# Patient Record
Sex: MALE | Race: OTHER | HISPANIC OR LATINO | URBAN - METROPOLITAN AREA
[De-identification: names, ages, dates, MRNs, and addresses within clinical notes are randomized per-mention and may not be internally consistent; named-entity substitution may affect disease eponyms.]

---

## 2019-12-05 ENCOUNTER — INPATIENT (INPATIENT)
Facility: HOSPITAL | Age: 24
LOS: 2 days | Discharge: HOME | DRG: 563 | End: 2019-12-08
Attending: STUDENT IN AN ORGANIZED HEALTH CARE EDUCATION/TRAINING PROGRAM | Admitting: HOSPITALIST
Payer: COMMERCIAL

## 2019-12-05 VITALS
HEART RATE: 93 BPM | DIASTOLIC BLOOD PRESSURE: 83 MMHG | RESPIRATION RATE: 16 BRPM | HEIGHT: 71 IN | OXYGEN SATURATION: 99 % | WEIGHT: 203.05 LBS | SYSTOLIC BLOOD PRESSURE: 124 MMHG | TEMPERATURE: 98 F

## 2019-12-05 LAB
BASOPHILS # BLD AUTO: 0.05 K/UL — SIGNIFICANT CHANGE UP (ref 0–0.2)
BASOPHILS NFR BLD AUTO: 0.6 % — SIGNIFICANT CHANGE UP (ref 0–2)
EOSINOPHIL # BLD AUTO: 0.12 K/UL — SIGNIFICANT CHANGE UP (ref 0–0.5)
EOSINOPHIL NFR BLD AUTO: 1.3 % — SIGNIFICANT CHANGE UP (ref 0–6)
HCT VFR BLD CALC: 48.1 % — SIGNIFICANT CHANGE UP (ref 39–50)
HGB BLD-MCNC: 16.9 G/DL — SIGNIFICANT CHANGE UP (ref 13–17)
IMM GRANULOCYTES NFR BLD AUTO: 1.3 % — SIGNIFICANT CHANGE UP (ref 0–1.5)
LYMPHOCYTES # BLD AUTO: 1.51 K/UL — SIGNIFICANT CHANGE UP (ref 1–3.3)
LYMPHOCYTES # BLD AUTO: 16.8 % — SIGNIFICANT CHANGE UP (ref 13–44)
MCHC RBC-ENTMCNC: 28.5 PG — SIGNIFICANT CHANGE UP (ref 27–34)
MCHC RBC-ENTMCNC: 35.1 GM/DL — SIGNIFICANT CHANGE UP (ref 32–36)
MCV RBC AUTO: 81 FL — SIGNIFICANT CHANGE UP (ref 80–100)
MONOCYTES # BLD AUTO: 0.5 K/UL — SIGNIFICANT CHANGE UP (ref 0–0.9)
MONOCYTES NFR BLD AUTO: 5.6 % — SIGNIFICANT CHANGE UP (ref 2–14)
NEUTROPHILS # BLD AUTO: 6.67 K/UL — SIGNIFICANT CHANGE UP (ref 1.8–7.4)
NEUTROPHILS NFR BLD AUTO: 74.4 % — SIGNIFICANT CHANGE UP (ref 43–77)
NRBC # BLD: 0 /100 WBCS — SIGNIFICANT CHANGE UP (ref 0–0)
PLATELET # BLD AUTO: 330 K/UL — SIGNIFICANT CHANGE UP (ref 150–400)
RBC # BLD: 5.94 M/UL — HIGH (ref 4.2–5.8)
RBC # FLD: 12.2 % — SIGNIFICANT CHANGE UP (ref 10.3–14.5)
WBC # BLD: 8.97 K/UL — SIGNIFICANT CHANGE UP (ref 3.8–10.5)
WBC # FLD AUTO: 8.97 K/UL — SIGNIFICANT CHANGE UP (ref 3.8–10.5)

## 2019-12-05 PROCEDURE — 73130 X-RAY EXAM OF HAND: CPT | Mod: 26,LT

## 2019-12-05 PROCEDURE — 99284 EMERGENCY DEPT VISIT MOD MDM: CPT

## 2019-12-05 RX ORDER — ACETAMINOPHEN 500 MG
975 TABLET ORAL ONCE
Refills: 0 | Status: COMPLETED | OUTPATIENT
Start: 2019-12-05 | End: 2019-12-05

## 2019-12-05 RX ORDER — IBUPROFEN 200 MG
600 TABLET ORAL ONCE
Refills: 0 | Status: COMPLETED | OUTPATIENT
Start: 2019-12-05 | End: 2019-12-05

## 2019-12-05 RX ADMIN — Medication 600 MILLIGRAM(S): at 22:52

## 2019-12-05 RX ADMIN — Medication 975 MILLIGRAM(S): at 22:52

## 2019-12-05 NOTE — ED ADULT NURSE NOTE - OBJECTIVE STATEMENT
23M with PMH/PSH including DM on metformin and insulin, ADHD on Adderall,  presenting to the ED with L index finger swelling and pain.  Pt reports slamming finger on fridge door this AM, noted swelling around 5 pm today. On exam pt Aox3 breathing even unlabored spontaneously NAD, left index finger swollen, purple color noted to finger, cap refill <2 seconds, +ROM in finger, no loss in sensation.

## 2019-12-05 NOTE — ED PROVIDER NOTE - PHYSICAL EXAMINATION
CONSTITUTIONAL: Patient is awake, alert and oriented x 3. Patient is well appearing and in no acute distress.  HEAD: NCAT,   NECK: supple, FROM   LUNGS: CTA B/L,  HEART: RRR.+S1S2 no murmurs,   ABDOMEN: Soft nd/nt+bs no rebound or guarding.   EXTREMITY: no edema or calf tenderness b/l, FROM upper and lower ext b/l. Left hand with diffuse swelling over dorsum with fusiform swelling and erythema distal to MCPJ. There is a scabbed abrasion just proximal to PIP. No ttp along flexor or extensor sheaths with ability to flex and extend at MCPJ, PIP and DIP. There is increased swelling and fluctuance w/ overlying ttp adjacent to nail bed on ulnar side   NEURO: No focal deficits CONSTITUTIONAL: Patient is awake, alert and oriented x 3. Patient is well appearing and in no acute distress.  HEAD: NCAT,   NECK: supple, FROM   LUNGS: CTA B/L,  HEART: RRR.+S1S2 no murmurs,   ABDOMEN: Soft nd/nt+bs no rebound or guarding.   : (chaperone Arata): uncircumcised, erythema and fissures to foreskin w/ ttp foreskin. retractable without lesions or discharge    EXTREMITY: no edema or calf tenderness b/l, FROM upper and lower ext b/l. Left hand with diffuse swelling over dorsum with fusiform swelling and erythema distal to MCPJ. There is a scabbed abrasion just proximal to PIP. No ttp along flexor or extensor sheaths with ability to flex and extend at MCPJ, PIP and DIP. There is increased swelling and fluctuance w/ overlying ttp adjacent to nail bed on ulnar side   NEURO: No focal deficits

## 2019-12-05 NOTE — ED PROVIDER NOTE - ATTENDING CONTRIBUTION TO CARE
Attending MD Mann:   I personally have seen and examined this patient.  Physician assistant note reviewed and agree on plan of care and except where noted.  See below for details.     Seen in FT2L    23M with PMH/PSH including DM on metformin and insulin, ADHD on Adderall, Amoxicillin allergy (diffuse rash, sore throat) presents to the ED with L index finger swelling and pain.  Reports slammed finger with fridge door [employer] at around 6am.  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  Reports noted swelling at around 5pm.  Reports had noted mild pain before noting the swelling.  Reports took Advil for pain at around 10am after slamming finger in fridge.  Reports is L hand dominant.  Reports tetanus UTD.  Denies fevers, chills.  Denies spontaneous drainage from finger.  Reports has a cut at the dorsal aspect of the L index finger just proximal to the PIP from a few days ago.      TO BE COMPLETED no paresthesia/no swelling/fingers/toes warm to touch/no cyanosis of extremity/capillary refill time < 2 seconds Attending MD Mann:   I personally have seen and examined this patient.  Physician assistant note reviewed and agree on plan of care and except where noted.  See below for details.     Seen in FT2L    23M with PMH/PSH including DM on metformin and insulin, ADHD on Adderall, Amoxicillin allergy (diffuse rash, sore throat) presents to the ED with L index finger swelling and pain.  Reports slammed finger with fridge door [employer] at around 6am.  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  Reports noted swelling at around 5pm.  Reports had noted mild pain before noting the swelling.  Reports took Advil for pain at around 10am after slamming finger in fridge.  Reports is L hand dominant.  Reports tetanus UTD.  Denies fevers, chills.  Denies spontaneous drainage from finger.  Reports has a cut at the dorsal aspect of the L index finger just proximal to the PIP from a few days ago.  ON exam, NAD, head NCAT, PERRL, FROM at neck, no tenderness to midline palpation, no stepoffs along length of spine, lungs CTAB with good inspiratory effort, +S1S2, no m/r/g, abdomen soft with +BS, NT, ND, no CVAT, moving all extremities with 5/5 strength bilateral upper and lower extremities, L index finger with fusiform swelling greatest at distal tip, +erythema with fluctuance especially at the ulnar aspect, ROM of finger limited secondary to pain, cap refill <2s, sensory grossly intact; A/P: 23M with L index pain and swelling, Ddx includes paronychia, ?now felon vs early flexor tenosynovitis, will obtain labs, hand consult (ortho), IV abx, cx site once I&D'ed, discussed with ortho, no abx at this time, requested finger not be I&D prior to their consult, will await    ADDENDUM: When told he would have to stay for IV abx, reports has uro appointment tomorrow, when asked why patient reported has had balanitis, reports initial medication oral/cream, SARITA Soria chaperoned, no testicular tenderness, areas of fissures on foreskin, uncircumcised

## 2019-12-05 NOTE — ED PROVIDER NOTE - OBJECTIVE STATEMENT
23 year old male with pmhx DM-1 presents to ED c/o right second finger 23 year old male with pmhx DM-1 presents to ED c/o right second finger swelling and pain today. Patient works 2 jobs and today worked a shift at [employer] where he believes he banged his left second finger. Noticed pain throughout his shift and took advil around 10am. Patient was at [employer] this evening and states he banged finger again while moving paper and noticed redness and swelling to his finger which has worsened since. Reports abrasion to same finger sustained a few days ago. Left hand dominate. Denies fevers, chills, numbness, tingling, weakness

## 2019-12-05 NOTE — ED ADULT NURSE NOTE - NSIMPLEMENTINTERV_GEN_ALL_ED
Implemented All Universal Safety Interventions:  [place] to call system. Call bell, personal items and telephone within reach. Instruct patient to call for assistance. Room bathroom lighting operational. Non-slip footwear when patient is off stretcher. Physically safe environment: no spills, clutter or unnecessary equipment. Stretcher in lowest position, wheels locked, appropriate side rails in place.
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
## 2019-12-06 ENCOUNTER — TRANSCRIPTION ENCOUNTER (OUTPATIENT)
Age: 24
End: 2019-12-06

## 2019-12-06 DIAGNOSIS — L08.9 LOCAL INFECTION OF THE SKIN AND SUBCUTANEOUS TISSUE, UNSPECIFIED: ICD-10-CM

## 2019-12-06 DIAGNOSIS — E11.65 TYPE 2 DIABETES MELLITUS WITH HYPERGLYCEMIA: ICD-10-CM

## 2019-12-06 DIAGNOSIS — I10 ESSENTIAL (PRIMARY) HYPERTENSION: ICD-10-CM

## 2019-12-06 DIAGNOSIS — F90.9 ATTENTION-DEFICIT HYPERACTIVITY DISORDER, UNSPECIFIED TYPE: ICD-10-CM

## 2019-12-06 DIAGNOSIS — Z29.9 ENCOUNTER FOR PROPHYLACTIC MEASURES, UNSPECIFIED: ICD-10-CM

## 2019-12-06 DIAGNOSIS — M65.9 SYNOVITIS AND TENOSYNOVITIS, UNSPECIFIED: ICD-10-CM

## 2019-12-06 DIAGNOSIS — E10.65 TYPE 1 DIABETES MELLITUS WITH HYPERGLYCEMIA: ICD-10-CM

## 2019-12-06 DIAGNOSIS — E78.5 HYPERLIPIDEMIA, UNSPECIFIED: ICD-10-CM

## 2019-12-06 DIAGNOSIS — Z02.9 ENCOUNTER FOR ADMINISTRATIVE EXAMINATIONS, UNSPECIFIED: ICD-10-CM

## 2019-12-06 LAB
ALBUMIN SERPL ELPH-MCNC: 3.8 G/DL — SIGNIFICANT CHANGE UP (ref 3.3–5)
ALBUMIN SERPL ELPH-MCNC: 4.8 G/DL — SIGNIFICANT CHANGE UP (ref 3.3–5)
ALP SERPL-CCNC: 125 U/L — HIGH (ref 40–120)
ALP SERPL-CCNC: 230 U/L — HIGH (ref 40–120)
ALT FLD-CCNC: 21 U/L — SIGNIFICANT CHANGE UP (ref 10–45)
ALT FLD-CCNC: 26 U/L — SIGNIFICANT CHANGE UP (ref 10–45)
ANION GAP SERPL CALC-SCNC: 15 MMOL/L — SIGNIFICANT CHANGE UP (ref 5–17)
ANION GAP SERPL CALC-SCNC: 16 MMOL/L — SIGNIFICANT CHANGE UP (ref 5–17)
APTT BLD: 30.2 SEC — SIGNIFICANT CHANGE UP (ref 27.5–36.3)
AST SERPL-CCNC: 15 U/L — SIGNIFICANT CHANGE UP (ref 10–40)
AST SERPL-CCNC: 22 U/L — SIGNIFICANT CHANGE UP (ref 10–40)
BASE EXCESS BLDV CALC-SCNC: 2 MMOL/L — SIGNIFICANT CHANGE UP (ref -2–2)
BILIRUB SERPL-MCNC: 0.6 MG/DL — SIGNIFICANT CHANGE UP (ref 0.2–1.2)
BILIRUB SERPL-MCNC: 0.6 MG/DL — SIGNIFICANT CHANGE UP (ref 0.2–1.2)
BLD GP AB SCN SERPL QL: NEGATIVE — SIGNIFICANT CHANGE UP
BUN SERPL-MCNC: 10 MG/DL — SIGNIFICANT CHANGE UP (ref 7–23)
BUN SERPL-MCNC: 12 MG/DL — SIGNIFICANT CHANGE UP (ref 7–23)
CA-I SERPL-SCNC: 1.13 MMOL/L — SIGNIFICANT CHANGE UP (ref 1.12–1.3)
CALCIUM SERPL-MCNC: 10 MG/DL — SIGNIFICANT CHANGE UP (ref 8.4–10.5)
CALCIUM SERPL-MCNC: 9.2 MG/DL — SIGNIFICANT CHANGE UP (ref 8.4–10.5)
CHLORIDE BLDV-SCNC: 98 MMOL/L — SIGNIFICANT CHANGE UP (ref 96–108)
CHLORIDE SERPL-SCNC: 87 MMOL/L — LOW (ref 96–108)
CHLORIDE SERPL-SCNC: 98 MMOL/L — SIGNIFICANT CHANGE UP (ref 96–108)
CO2 BLDV-SCNC: 28 MMOL/L — SIGNIFICANT CHANGE UP (ref 22–30)
CO2 SERPL-SCNC: 23 MMOL/L — SIGNIFICANT CHANGE UP (ref 22–31)
CO2 SERPL-SCNC: 26 MMOL/L — SIGNIFICANT CHANGE UP (ref 22–31)
CREAT SERPL-MCNC: 0.51 MG/DL — SIGNIFICANT CHANGE UP (ref 0.5–1.3)
CREAT SERPL-MCNC: 0.52 MG/DL — SIGNIFICANT CHANGE UP (ref 0.5–1.3)
GAS PNL BLDV: 127 MMOL/L — LOW (ref 135–145)
GAS PNL BLDV: SIGNIFICANT CHANGE UP
GAS PNL BLDV: SIGNIFICANT CHANGE UP
GLUCOSE BLDC GLUCOMTR-MCNC: 196 MG/DL — HIGH (ref 70–99)
GLUCOSE BLDC GLUCOMTR-MCNC: 215 MG/DL — HIGH (ref 70–99)
GLUCOSE BLDC GLUCOMTR-MCNC: 262 MG/DL — HIGH (ref 70–99)
GLUCOSE BLDC GLUCOMTR-MCNC: 262 MG/DL — HIGH (ref 70–99)
GLUCOSE BLDC GLUCOMTR-MCNC: 278 MG/DL — HIGH (ref 70–99)
GLUCOSE BLDC GLUCOMTR-MCNC: 354 MG/DL — HIGH (ref 70–99)
GLUCOSE BLDV-MCNC: 445 MG/DL — HIGH (ref 70–99)
GLUCOSE SERPL-MCNC: 298 MG/DL — HIGH (ref 70–99)
GLUCOSE SERPL-MCNC: 476 MG/DL — CRITICAL HIGH (ref 70–99)
HBA1C BLD-MCNC: >15.5 % — HIGH (ref 4–5.6)
HCO3 BLDV-SCNC: 27 MMOL/L — SIGNIFICANT CHANGE UP (ref 21–29)
HCT VFR BLDA CALC: 53 % — HIGH (ref 39–50)
HGB BLD CALC-MCNC: 17.4 G/DL — HIGH (ref 13–17)
HOROWITZ INDEX BLDV+IHG-RTO: SIGNIFICANT CHANGE UP
INR BLD: 0.94 RATIO — SIGNIFICANT CHANGE UP (ref 0.88–1.16)
LACTATE BLDV-MCNC: 1.6 MMOL/L — SIGNIFICANT CHANGE UP (ref 0.7–2)
PCO2 BLDV: 44 MMHG — SIGNIFICANT CHANGE UP (ref 35–50)
PH BLDV: 7.4 — SIGNIFICANT CHANGE UP (ref 7.35–7.45)
PO2 BLDV: 36 MMHG — SIGNIFICANT CHANGE UP (ref 25–45)
POTASSIUM BLDV-SCNC: 3.4 MMOL/L — LOW (ref 3.5–5.3)
POTASSIUM SERPL-MCNC: 3.2 MMOL/L — LOW (ref 3.5–5.3)
POTASSIUM SERPL-MCNC: 4 MMOL/L — SIGNIFICANT CHANGE UP (ref 3.5–5.3)
POTASSIUM SERPL-SCNC: 3.2 MMOL/L — LOW (ref 3.5–5.3)
POTASSIUM SERPL-SCNC: 4 MMOL/L — SIGNIFICANT CHANGE UP (ref 3.5–5.3)
PROT SERPL-MCNC: 6.8 G/DL — SIGNIFICANT CHANGE UP (ref 6–8.3)
PROT SERPL-MCNC: 7.9 G/DL — SIGNIFICANT CHANGE UP (ref 6–8.3)
PROTHROM AB SERPL-ACNC: 10.7 SEC — SIGNIFICANT CHANGE UP (ref 10–12.9)
RH IG SCN BLD-IMP: POSITIVE — SIGNIFICANT CHANGE UP
SAO2 % BLDV: 66 % — LOW (ref 67–88)
SODIUM SERPL-SCNC: 129 MMOL/L — LOW (ref 135–145)
SODIUM SERPL-SCNC: 136 MMOL/L — SIGNIFICANT CHANGE UP (ref 135–145)

## 2019-12-06 PROCEDURE — 12345: CPT | Mod: NC,GC

## 2019-12-06 PROCEDURE — 99223 1ST HOSP IP/OBS HIGH 75: CPT | Mod: GC

## 2019-12-06 PROCEDURE — 99255 IP/OBS CONSLTJ NEW/EST HI 80: CPT

## 2019-12-06 PROCEDURE — 99221 1ST HOSP IP/OBS SF/LOW 40: CPT

## 2019-12-06 RX ORDER — INSULIN GLARGINE 100 [IU]/ML
13 INJECTION, SOLUTION SUBCUTANEOUS EVERY MORNING
Refills: 0 | Status: DISCONTINUED | OUTPATIENT
Start: 2019-12-06 | End: 2019-12-06

## 2019-12-06 RX ORDER — INSULIN LISPRO 100/ML
6 VIAL (ML) SUBCUTANEOUS ONCE
Refills: 0 | Status: COMPLETED | OUTPATIENT
Start: 2019-12-06 | End: 2019-12-06

## 2019-12-06 RX ORDER — INSULIN LISPRO 100/ML
VIAL (ML) SUBCUTANEOUS
Refills: 0 | Status: DISCONTINUED | OUTPATIENT
Start: 2019-12-06 | End: 2019-12-08

## 2019-12-06 RX ORDER — INSULIN GLARGINE 100 [IU]/ML
20 INJECTION, SOLUTION SUBCUTANEOUS AT BEDTIME
Refills: 0 | Status: DISCONTINUED | OUTPATIENT
Start: 2019-12-06 | End: 2019-12-06

## 2019-12-06 RX ORDER — POVIDONE-IODINE 5 %
1 AEROSOL (ML) TOPICAL
Refills: 0 | Status: DISCONTINUED | OUTPATIENT
Start: 2019-12-06 | End: 2019-12-08

## 2019-12-06 RX ORDER — INSULIN LISPRO 100/ML
VIAL (ML) SUBCUTANEOUS EVERY 6 HOURS
Refills: 0 | Status: DISCONTINUED | OUTPATIENT
Start: 2019-12-06 | End: 2019-12-06

## 2019-12-06 RX ORDER — INFLUENZA VIRUS VACCINE 15; 15; 15; 15 UG/.5ML; UG/.5ML; UG/.5ML; UG/.5ML
0.5 SUSPENSION INTRAMUSCULAR ONCE
Refills: 0 | Status: DISCONTINUED | OUTPATIENT
Start: 2019-12-06 | End: 2019-12-08

## 2019-12-06 RX ORDER — CEFEPIME 1 G/1
INJECTION, POWDER, FOR SOLUTION INTRAMUSCULAR; INTRAVENOUS
Refills: 0 | Status: DISCONTINUED | OUTPATIENT
Start: 2019-12-06 | End: 2019-12-08

## 2019-12-06 RX ORDER — SODIUM CHLORIDE 9 MG/ML
1000 INJECTION INTRAMUSCULAR; INTRAVENOUS; SUBCUTANEOUS ONCE
Refills: 0 | Status: COMPLETED | OUTPATIENT
Start: 2019-12-06 | End: 2019-12-06

## 2019-12-06 RX ORDER — DEXTROSE 50 % IN WATER 50 %
15 SYRINGE (ML) INTRAVENOUS ONCE
Refills: 0 | Status: DISCONTINUED | OUTPATIENT
Start: 2019-12-06 | End: 2019-12-08

## 2019-12-06 RX ORDER — POTASSIUM CHLORIDE 20 MEQ
40 PACKET (EA) ORAL EVERY 4 HOURS
Refills: 0 | Status: COMPLETED | OUTPATIENT
Start: 2019-12-06 | End: 2019-12-06

## 2019-12-06 RX ORDER — SODIUM CHLORIDE 9 MG/ML
1000 INJECTION, SOLUTION INTRAVENOUS
Refills: 0 | Status: DISCONTINUED | OUTPATIENT
Start: 2019-12-06 | End: 2019-12-08

## 2019-12-06 RX ORDER — ENOXAPARIN SODIUM 100 MG/ML
40 INJECTION SUBCUTANEOUS EVERY 24 HOURS
Refills: 0 | Status: DISCONTINUED | OUTPATIENT
Start: 2019-12-06 | End: 2019-12-07

## 2019-12-06 RX ORDER — INSULIN LISPRO 100/ML
5 VIAL (ML) SUBCUTANEOUS
Refills: 0 | Status: DISCONTINUED | OUTPATIENT
Start: 2019-12-06 | End: 2019-12-06

## 2019-12-06 RX ORDER — INSULIN GLARGINE 100 [IU]/ML
18 INJECTION, SOLUTION SUBCUTANEOUS EVERY MORNING
Refills: 0 | Status: DISCONTINUED | OUTPATIENT
Start: 2019-12-07 | End: 2019-12-08

## 2019-12-06 RX ORDER — INSULIN LISPRO 100/ML
VIAL (ML) SUBCUTANEOUS AT BEDTIME
Refills: 0 | Status: DISCONTINUED | OUTPATIENT
Start: 2019-12-06 | End: 2019-12-08

## 2019-12-06 RX ORDER — CEFEPIME 1 G/1
2000 INJECTION, POWDER, FOR SOLUTION INTRAMUSCULAR; INTRAVENOUS EVERY 12 HOURS
Refills: 0 | Status: DISCONTINUED | OUTPATIENT
Start: 2019-12-06 | End: 2019-12-08

## 2019-12-06 RX ORDER — DEXTROSE 50 % IN WATER 50 %
25 SYRINGE (ML) INTRAVENOUS ONCE
Refills: 0 | Status: DISCONTINUED | OUTPATIENT
Start: 2019-12-06 | End: 2019-12-08

## 2019-12-06 RX ORDER — GLUCAGON INJECTION, SOLUTION 0.5 MG/.1ML
1 INJECTION, SOLUTION SUBCUTANEOUS ONCE
Refills: 0 | Status: DISCONTINUED | OUTPATIENT
Start: 2019-12-06 | End: 2019-12-08

## 2019-12-06 RX ORDER — VANCOMYCIN HCL 1 G
1000 VIAL (EA) INTRAVENOUS ONCE
Refills: 0 | Status: COMPLETED | OUTPATIENT
Start: 2019-12-06 | End: 2019-12-06

## 2019-12-06 RX ORDER — VANCOMYCIN HCL 1 G
1250 VIAL (EA) INTRAVENOUS EVERY 12 HOURS
Refills: 0 | Status: DISCONTINUED | OUTPATIENT
Start: 2019-12-06 | End: 2019-12-08

## 2019-12-06 RX ORDER — INSULIN LISPRO 100/ML
6 VIAL (ML) SUBCUTANEOUS
Refills: 0 | Status: DISCONTINUED | OUTPATIENT
Start: 2019-12-06 | End: 2019-12-08

## 2019-12-06 RX ORDER — CEFEPIME 1 G/1
2000 INJECTION, POWDER, FOR SOLUTION INTRAMUSCULAR; INTRAVENOUS ONCE
Refills: 0 | Status: COMPLETED | OUTPATIENT
Start: 2019-12-06 | End: 2019-12-06

## 2019-12-06 RX ORDER — DEXTROSE 50 % IN WATER 50 %
12.5 SYRINGE (ML) INTRAVENOUS ONCE
Refills: 0 | Status: DISCONTINUED | OUTPATIENT
Start: 2019-12-06 | End: 2019-12-08

## 2019-12-06 RX ADMIN — Medication 1: at 18:27

## 2019-12-06 RX ADMIN — SODIUM CHLORIDE 125 MILLILITER(S): 9 INJECTION, SOLUTION INTRAVENOUS at 07:00

## 2019-12-06 RX ADMIN — Medication 1: at 22:58

## 2019-12-06 RX ADMIN — CEFEPIME 100 MILLIGRAM(S): 1 INJECTION, POWDER, FOR SOLUTION INTRAMUSCULAR; INTRAVENOUS at 07:01

## 2019-12-06 RX ADMIN — CEFEPIME 100 MILLIGRAM(S): 1 INJECTION, POWDER, FOR SOLUTION INTRAMUSCULAR; INTRAVENOUS at 18:51

## 2019-12-06 RX ADMIN — Medication 6 UNIT(S): at 00:52

## 2019-12-06 RX ADMIN — Medication 40 MILLIEQUIVALENT(S): at 18:30

## 2019-12-06 RX ADMIN — Medication 1 APPLICATION(S): at 18:51

## 2019-12-06 RX ADMIN — Medication 2: at 11:55

## 2019-12-06 RX ADMIN — Medication 1 APPLICATION(S): at 18:52

## 2019-12-06 RX ADMIN — Medication 40 MILLIEQUIVALENT(S): at 12:09

## 2019-12-06 RX ADMIN — Medication 166.67 MILLIGRAM(S): at 23:27

## 2019-12-06 RX ADMIN — Medication 166.67 MILLIGRAM(S): at 12:08

## 2019-12-06 RX ADMIN — Medication 250 MILLIGRAM(S): at 03:25

## 2019-12-06 RX ADMIN — SODIUM CHLORIDE 1000 MILLILITER(S): 9 INJECTION INTRAMUSCULAR; INTRAVENOUS; SUBCUTANEOUS at 00:51

## 2019-12-06 RX ADMIN — Medication 5 UNIT(S): at 11:56

## 2019-12-06 RX ADMIN — Medication 3: at 08:25

## 2019-12-06 RX ADMIN — Medication 1 APPLICATION(S): at 11:56

## 2019-12-06 RX ADMIN — Medication 6 UNIT(S): at 18:27

## 2019-12-06 RX ADMIN — INSULIN GLARGINE 13 UNIT(S): 100 INJECTION, SOLUTION SUBCUTANEOUS at 05:09

## 2019-12-06 NOTE — DISCHARGE NOTE PROVIDER - HOSPITAL COURSE
22 y/o M PMHx T1DM (on insulin and metformin), ADHD (on Adderall), presented to ED with L index finger swelling and pain. Patient reports he slammed his finger against a refrigerator door at work yesterday morning. Later on in the day he banged the same finger again, then noticed swelling and redness afterwards. He also notes he had a cut to the same finger a few days earlier. Denies any fever or chills. He is left hand dominant. Denies any drainage or pus. No trauma to any other fingers. No recent hospitalizations, no recent travel. Works 2 jobs, in [employer] and [employer].         In the ED:    Afebrile, normotensive, and satting well on RA. Labs significant for . Xray hand showed circumferential soft tissue swelling of the left second digit. Given vancomycin, 1L NS, and Humalog 6u. Ortho performed decompression and cx sent.            HOSPITAL COURSE: 22 y/o M PMHx T2DM (on insulin and metformin), ADHD (on Adderall), presented to ED with L index finger swelling and pain admitted for early infectious flexor tenosynovitis s/p decompression by ortho. Wound cultures grew _________. He was empirically treated with vancomycin and cefepime (MRSA and pseudomonas). Endocrinology was consulted for Atc > 15.5 and sugars 457 at admission. Diabetes education. 22 y/o M PMHx T1DM (on insulin and metformin), ADHD (on Adderall), presented to ED with L index finger swelling and pain. Patient reports he slammed his finger against a refrigerator door at work yesterday morning. Later on in the day he banged the same finger again, then noticed swelling and redness afterwards. He also notes he had a cut to the same finger a few days earlier. Denies any fever or chills. He is left hand dominant. Denies any drainage or pus. No trauma to any other fingers. No recent hospitalizations, no recent travel. Works 2 jobs, in [employer] and [employer].     In the ED:    Afebrile, normotensive, and satting well on RA. Labs significant for . Xray hand showed circumferential soft tissue swelling of the left second digit. Given vancomycin, 1L NS, and Humalog 6u. Ortho performed decompression and cx sent.        HOSPITAL COURSE: 22 y/o M PMHx T2DM (on insulin and metformin), ADHD (on Adderall), presented to ED with L index finger swelling and pain admitted for early infectious flexor tenosynovitis s/p decompression by ortho. Wound cultures grew Group B strep, sensitive to penicillin, ampicillin and Ancef. He was empirically treated with vancomycin and cefepime (MRSA and pseudomonas), pt will transition to Keflex 500mg QID before discharge, course to be completed 12/17. Endocrinology was consulted for Atc > 15.5 and sugars 457 at admission. Diabetes education noted 24 y/o M PMHx T1DM (on insulin and metformin), ADHD (on Adderall), presented to ED with L index finger swelling and pain. Patient reports he slammed his finger against a refrigerator door at work yesterday morning. Later on in the day he banged the same finger again, then noticed swelling and redness afterwards. He also notes he had a cut to the same finger a few days earlier. Denies any fever or chills. He is left hand dominant. Denies any drainage or pus. No trauma to any other fingers. No recent hospitalizations, no recent travel. Works 2 jobs, in [employer] and [employer].     In the ED:    Afebrile, normotensive, and satting well on RA. Labs significant for . Xray hand showed circumferential soft tissue swelling of the left second digit. Given vancomycin, 1L NS, and Humalog 6u. Ortho performed decompression and cx sent.        HOSPITAL COURSE: 24 y/o M PMHx T2DM (on insulin and metformin), ADHD (on Adderall), presented to ED with L index finger swelling and pain admitted for early infectious flexor tenosynovitis s/p decompression by ortho. Wound cultures grew Group B strep, sensitive to penicillin, ampicillin and Ancef. He was empirically treated with vancomycin and cefepime (MRSA and pseudomonas), pt will transition to Keflex 500mg QID before discharge, course to be completed 12/17. Endocrinology was consulted for Atc > 15.5 and sugars 457 at admission. Diabetes education noted. pt will be discharged home on Basaglar 20u qhs, pre-meal insulin 6 units TID. Pt will need follow up with Dr. Bernice Muñoz, Endocrinology

## 2019-12-06 NOTE — PROGRESS NOTE ADULT - PROBLEM SELECTOR PLAN 1
Concern for early L index finger infectious flexor tenosynovitis s/p decompression with purulent drainage.   Vancomycin and cefepime to cover MRSA  and pseudomonas (poor DM1 control)  - c/w Vanc/Cefepime  - f/u wound culture  - Ortho recs  - Noted to have isolated Alk phos elevation, possibly from bone origin. If remains persistently elevated and not clinically improving, consider MRI to r/o osteomyelitis. Concern for early L index finger infectious flexor tenosynovitis s/p decompression with purulent drainage.   Vancomycin and cefepime to cover MRSA  and pseudomonas (poor DM1 control)  - c/w Vanc/Cefepime  - f/u wound culture  - Ortho recs  - Alk phos elevation, possibly from bone. If remains persistently elevated and not clinically improving, consider MRI to r/o osteomyelitis.

## 2019-12-06 NOTE — CHART NOTE - NSCHARTNOTEFT_GEN_A_CORE
Patient discussed with Dr. Vazquez  Increase betadine soaks to 4 times per day  Continue current technique  Thanks    TOMMY Squires MD

## 2019-12-06 NOTE — CONSULT NOTE ADULT - PROBLEM SELECTOR RECOMMENDATION 3
- BP goal less than 140/90, BP slightly above goal currently  - would continue to monitor and start antihypertensives as needed

## 2019-12-06 NOTE — CONSULT NOTE ADULT - SUBJECTIVE AND OBJECTIVE BOX
22 yo male presents to [facility] ED c/o Left index finger swelling and pain for one day duration.  Patient states yesterday he closed his finger in the door at work, since then it became swollen, red and TTP.  Pain worsened and the patient presented to ER for care.  Patient denies any fvers or chills, denies night sweats, states pain is mainly at tip of finger, able to bend and extend.  Has hx of DM1, not taking insulin at this time, Blood sugar > 400 in ER.    PAST MEDICAL & SURGICAL HISTORY:  ADHD  Type 1 diabetes      Vital Signs Last 24 Hrs  T(C): 36.8 (05 Dec 2019 22:26), Max: 36.8 (05 Dec 2019 22:26)  T(F): 98.2 (05 Dec 2019 22:26), Max: 98.2 (05 Dec 2019 22:26)  HR: 93 (05 Dec 2019 22:26) (93 - 93)  BP: 124/83 (05 Dec 2019 22:26) (124/83 - 124/83)  BP(mean): --  RR: 16 (05 Dec 2019 22:26) (16 - 16)  SpO2: 99% (05 Dec 2019 22:26) (99% - 99%)    PE:   Gen: NAD, AAOx3    LUE:   Fusiform swelling of left index finger, mainly along distal tip, no swelling or erythema in other digits or palm  Erythema to distal ulnar aspect of index finger, palpable fluctuance, TTP mainly dorsally,   No TTP along Volar surface of pulp or flexor sheath, no pain with digit flex/ext  No collar button abscess appreciated, no TTP or swelling along other digits, AIN/PIN/Ulnar/Radial/Median nerve intact, silt globally, compartments soft.       Procedure: After verbal consent, 8cc of 1% lidocaine was injected into 2nd digit for local anesthetic,  The finger was prepped and draped in sterile fashion, cleaned with alcohol 11 blade was used to incise paronychia gross pus cultured from wound, irrigated thoroughly with sterile saline, covered with xeroform, 4x4 and gerard. Pt tolerated procedure well.

## 2019-12-06 NOTE — CONSULT NOTE ADULT - SUBJECTIVE AND OBJECTIVE BOX
HPI:  Patient is a 23 year old man PMH uncontrolled DM2, HTN, HLD, ADHD (on Adderall), presented to ED with L index finger swelling and pain, now admitted for management of cellulitis of the finger. Consult called for management of uncontrolled DM2.    Patient was diagnosed with DM2 at age 21. States he follows with endocrinologist Dr. Clementine Muñoz. Reports his last HbA1c was ~14% in March 2019. Has not seen endocrine since then. Currently on Metformin 2 grams daily, but misses half the doses. Also takes Basaglar, but takes 5 units before breakfast and lunch and 10 units before dinner. He did not know how Basaglar works. Does not check BG. He does not have symptoms of neuropathy. Saw optho within the last year, does not have retinopathy. No history of nephropathy. Diet wise, he is trying to cut the soda, does not drink juice. He admits to eating a lot of carbs/fast food. He has been exercising more and has intentionally lost about 15 lbs. No blurry vision, polyuria, polydipsia.     PAST MEDICAL & SURGICAL HISTORY:  Type 2 diabetes mellitus  ADHD  No significant past surgical history    FAMILY HISTORY:  DM2 in father    Social History:  No cigarette use  no alcohol use  works in [employer]    Outpatient Medications:  · 	Basaglar KwikPen 100 units/mL subcutaneous solution: Last Dose Taken:  , 20 unit(s) subcutaneous once a day (at bedtime)  · 	metFORMIN 1000 mg oral tablet: Last Dose Taken:  , 1000 milligram(s) orally once a day    MEDICATIONS  (STANDING):  cefepime   IVPB      cefepime   IVPB 2000 milliGRAM(s) IV Intermittent every 12 hours  clotrimazole 1% Cream 1 Application(s) Topical two times a day  dextrose 5%. 1000 milliLiter(s) (50 mL/Hr) IV Continuous <Continuous>  dextrose 50% Injectable 12.5 Gram(s) IV Push once  dextrose 50% Injectable 25 Gram(s) IV Push once  dextrose 50% Injectable 25 Gram(s) IV Push once  enoxaparin Injectable 40 milliGRAM(s) SubCutaneous every 24 hours  influenza   Vaccine 0.5 milliLiter(s) IntraMuscular once  insulin glargine Injectable (LANTUS) 20 Unit(s) SubCutaneous at bedtime  insulin lispro (HumaLOG) corrective regimen sliding scale   SubCutaneous three times a day before meals  insulin lispro (HumaLOG) corrective regimen sliding scale   SubCutaneous at bedtime  insulin lispro (HumaLOG) corrective regimen sliding scale   SubCutaneous every 6 hours  insulin lispro Injectable (HumaLOG) 5 Unit(s) SubCutaneous three times a day before meals  lactated ringers. 1000 milliLiter(s) (125 mL/Hr) IV Continuous <Continuous>  potassium chloride    Tablet ER 40 milliEquivalent(s) Oral every 4 hours  povidone iodine 10% Solution 1 Application(s) Topical two times a day  vancomycin  IVPB 1250 milliGRAM(s) IV Intermittent every 12 hours    MEDICATIONS  (PRN):  dextrose 40% Gel 15 Gram(s) Oral once PRN Blood Glucose LESS THAN 70 milliGRAM(s)/deciliter  glucagon  Injectable 1 milliGRAM(s) IntraMuscular once PRN Glucose LESS THAN 70 milligrams/deciliter      Allergies  amoxicillin (Anaphylaxis; Rash)    Review of Systems:  Constitutional: No fever  Eyes: No blurry vision  Neuro: No tremors  HEENT: No pain  Cardiovascular: No chest pain, palpitations  Respiratory: No SOB, no cough  GI: No nausea, vomiting, abdominal pain  : No dysuria  Skin: + wound on finger  Endocrine: no polyuria, polydipsia    ALL OTHER SYSTEMS REVIEWED AND NEGATIVE    PHYSICAL EXAM:  VITALS: T(C): 37.2 (12-06-19 @ 12:07)  T(F): 99 (12-06-19 @ 12:07), Max: 99 (12-06-19 @ 12:07)  HR: 85 (12-06-19 @ 12:07) (80 - 93)  BP: 143/93 (12-06-19 @ 12:07) (124/83 - 143/93)  RR:  (16 - 16)  SpO2:  (98% - 99%)  Wt(kg): --  GENERAL: NAD, well-developed  EYES: No proptosis, anicteric  HEENT:  Atraumatic, Normocephalic  THYROID: Normal size, no palpable nodules  RESPIRATORY: Clear to auscultation bilaterally; No rales, rhonchi, wheezing  CARDIOVASCULAR: Regular rate and rhythm; No murmurs; no peripheral edema  GI: Soft, nontender, non distended, normal bowel sounds  SKIN: Dry, intact, No ulcers on feet; left index finger bandaged   PSYCH: Alert and oriented x 3, reactive affect      POCT Blood Glucose.: 215 mg/dL (12-06-19 @ 11:40) H 5, H 2  POCT Blood Glucose.: 262 mg/dL (12-06-19 @ 07:29) L 13  POCT Blood Glucose.: 262 mg/dL (12-06-19 @ 04:41)  POCT Blood Glucose.: 354 mg/dL (12-06-19 @ 01:50)  POCT Blood Glucose.: 387 mg/dL (12-06-19 @ 00:38)                          16.9   8.97  )-----------( 330      ( 05 Dec 2019 23:25 )             48.1       12-06    136  |  98  |  10  ----------------------------<  298<H>  3.2<L>   |  23  |  0.51    EGFR if : 178  EGFR if non : 154    Ca    9.2      12-06    TPro  6.8  /  Alb  3.8  /  TBili  0.6  /  DBili  x   /  AST  15  /  ALT  21  /  AlkPhos  125<H>  12-06

## 2019-12-06 NOTE — PROGRESS NOTE ADULT - PROBLEM SELECTOR PLAN 2
on admission, s/p Humalog 6u and 13U lantus. Patient reports he is on Basaglar 20u at home, but splits it up into 3 doses.  - NPO in case of OR in AM  - will give Lantus 13u now  - diabetes education  - social work consult for affording insulin   - ISS q6h, finger sticks  on admission, s/p Humalog 6u and 13U lantus. Patient reports he is on Basaglar 20u at home, but splits it up into 3 doses.  -Lantus 20U  - diabetes education  - social work consult for affording insulin   - ISS q6h, finger sticks  on admission, s/p Humalog 6u and 13U lantus. Patient reports he is on Basaglar 20u at home, but splits it up into 3 doses.  -Lantus 18U with Humalog 6 TID pre meals  - diabetes education  - social work consult for affording insulin   - ISS q6h, finger sticks

## 2019-12-06 NOTE — H&P ADULT - PROBLEM SELECTOR PLAN 1
L index finger purulent cellulitis s/p I&D. Will cover for MRSA with Vancomycin  - c/w Vanc 1g q12h  - f/u wound culture Concern for early L index finger flexor tenosynovitis, now s/p decompression with purulent drainage. Will cover for MRSA given purulent cellulitis  - c/w Vanc 1g q12h  - f/u wound culture Concern for early L index finger flexor tenosynovitis, now s/p decompression with purulent drainage. Will cover for MRSA given purulent cellulitis and for pseudomonas given poorly controlled DM  - c/w Vanc/Cefepime  - f/u wound culture  - Noted to have isolated Alk phos elevation, possibly from bone origin. If remains persistently elevated and not clinically improving then consider MRI to r/o osteomyelitis. Concern for early L index finger infectious flexor tenosynovitis, now s/p decompression with purulent drainage. Will cover for MRSA given purulent cellulitis and for pseudomonas given poorly controlled DM  - c/w Vanc/Cefepime  - f/u wound culture  - f/u Ortho recs in AM, may take to OR if clinically does not improve  - Noted to have isolated Alk phos elevation, possibly from bone origin. If remains persistently elevated and not clinically improving then consider MRI to r/o osteomyelitis.

## 2019-12-06 NOTE — DISCHARGE NOTE PROVIDER - CARE PROVIDER_API CALL
Linda Vazquez)  Orthopedics  [address] [place] [address], [address]  [place] [address]  Phone: [phone]  Fax: [phone]  Follow Up Time: Linda Vazquez)  Orthopedics  [address] [place] [address], [address]  [place] [address]  Phone: [phone]  Fax: [phone]  Follow Up Time:

## 2019-12-06 NOTE — CONSULT NOTE ADULT - ASSESSMENT
23 year old man PMH uncontrolled DM2, HTN, HLD, ADHD (on Adderall), here with cellulitis of the finger, also with hyperglycemia in setting of uncontrolled DM2. BG goal 100-180 mg/dL.

## 2019-12-06 NOTE — DISCHARGE NOTE PROVIDER - NSDCMRMEDTOKEN_GEN_ALL_CORE_FT
Melissaaglbrie Foster 100 units/mL subcutaneous solution: 20 unit(s) subcutaneous once a day (at bedtime)  metFORMIN 1000 mg oral tablet: 1000 milligram(s) orally once a day Melissaaglar KwikPen 100 units/mL subcutaneous solution: 20 unit(s) subcutaneous once a day (at bedtime)  cephalexin 500 mg oral capsule: 1 cap(s) orally 4 times a day  metFORMIN 1000 mg oral tablet: 1000 milligram(s) orally once a day  povidone iodine 10% topical solution: Apply topically to affected area 4 times a day alcohol swabs : Apply topically to affected area 4 times a day   Basaglar KwikPen 100 units/mL subcutaneous solution: 20 unit(s) subcutaneous once a day (at bedtime)  cephalexin 500 mg oral capsule: 1 cap(s) orally 4 times a day  glucometer (per patient&#x27;s insurance): 1 application subcutaneous 4 times a day   HumaLOG KwikPen 100 units/mL injectable solution: 6 unit(s) injectable 3 times a day (before meals)   metFORMIN 1000 mg oral tablet: 1000 milligram(s) orally once a day  povidone iodine 10% topical solution: Apply topically to affected area 4 times a day

## 2019-12-06 NOTE — H&P ADULT - PROBLEM SELECTOR PLAN 2
on admission, s/p Humalog 6u in ED. Patient reports he is on Basaglar 20u at home, but splits it up into 3 doses.   - c/w Basaglar 20u qhs  - diabetes education  on admission, s/p Humalog 6u in ED. Patient reports he is on Basaglar 20u at home, but splits it up into 3 doses.   - Pt NPO in case of OR in AM  - will give dose of NPH to bridge until nighttime Lantus  - diabetes education  on admission, s/p Humalog 6u in ED. Patient reports he is on Basaglar 20u at home, but splits it up into 3 doses.   - NPO in case of OR in AM  - will give Lantus 13u now  - diabetes education  - ISS q6h while NPO  - Endocrine consult in AM  on admission, s/p Humalog 6u in ED. Patient reports he is on Basaglar 20u at home, but splits it up into 3 doses.  - NPO in case of OR in AM  - will give Lantus 13u now  - diabetes education  - social work consult as patient may be rationing insulin   - ISS q6h while NPO  - Endocrine consult in AM
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
## 2019-12-06 NOTE — PROGRESS NOTE ADULT - ASSESSMENT
22 y/o M PMHx T1DM (on insulin and metformin), ADHD (on Adderall), presented to ED with L index finger swelling and pain, concern for early infectious flexor tenosynovitis s/p decompression on vancomycin and cefepime

## 2019-12-06 NOTE — CONSULT NOTE ADULT - ATTENDING COMMENTS
Colt Marie MD   Pager # [phone]  On evenings and weekends, please call the office at [phone] or page endocrine fellow on call. Please note that this patient may be followed by different provider tomorrow. If no answer, contact the office.

## 2019-12-06 NOTE — H&P ADULT - NSHPREVIEWOFSYSTEMS_GEN_ALL_CORE
CONSTITUTIONAL: No fever, no chills, no weight loss  EYES: No eye pain, no visual disturbance  ENT: No sore throat, no ear pain  RESPIRATORY: No cough, No SOB  CARDIOVASCULAR: No CP, no palpitations  GASTROINTESTINAL: no abdominal pain, no n/v/d  GENITOURINARY: No dysuria, no hematuria  HEME: No easy bruising, no bleeding gums  NEURO: No headaches, no weakness  SKIN: No itching, no rashes  MSK: +L index finger pain, + swelling

## 2019-12-06 NOTE — H&P ADULT - NSHPPHYSICALEXAM_GEN_ALL_CORE
Vital Signs Last 24 Hrs  T(C): 36.8 (05 Dec 2019 22:26), Max: 36.8 (05 Dec 2019 22:26)  T(F): 98.2 (05 Dec 2019 22:26), Max: 98.2 (05 Dec 2019 22:26)  HR: 93 (05 Dec 2019 22:26) (93 - 93)  BP: 124/83 (05 Dec 2019 22:26) (124/83 - 124/83)  BP(mean): --  RR: 16 (05 Dec 2019 22:26) (16 - 16)  SpO2: 99% (05 Dec 2019 22:26) (99% - 99%)    GENERAL: NAD, well-developed  HEAD:  Atraumatic, normocephalic  EYES: EOMI, conjunctiva and sclera clear  MOUTH: MMM, no lesions  NECK: Supple, no appreciable masses  LUNG: Clear to auscultation bilaterally, no increased work of breathing  CHEST: S1/S2+, no S3/S4, RRR, no murmurs appreciated  ABDOMEN: Soft, nontender, nondistended, +BS in all 4 quadrants  : No CVA tenderness, no suprapubic tenderness, no lopez catheter  EXTREMITIES:  L index finger swelling s/p I&D  NEURO: AAOx3, no focal deficits, strength grossly intact in all extremities  SKIN: warm and dry, no visible rashes

## 2019-12-06 NOTE — PROGRESS NOTE ADULT - SUBJECTIVE AND OBJECTIVE BOX
Shiloh Anand MD  PGY-1  Pager [phone]  Pager LID [phone]      Patient is a 23y old  Male who presents with a chief complaint of L index finger pain (06 Dec 2019 04:00)        SUBJECTIVE / OVERNIGHT EVENTS: Patient had no acute events overnight. Patient seen and examined at bedside this morning. His pain has improved from 8/10 to 3/10 in Left index finger. His endocrinologist is Dr. Bernice Muñoz and reports his last A1c 14 in May.     ROS: [ - ] Fever [ - ] Chills [ - ] Nausea/Vomiting [ - ] Chest Pain [ - ] Shortness of breath     MEDICATIONS  (STANDING):  cefepime   IVPB      cefepime   IVPB 2000 milliGRAM(s) IV Intermittent every 12 hours  clotrimazole 1% Cream 1 Application(s) Topical two times a day  dextrose 5%. 1000 milliLiter(s) (50 mL/Hr) IV Continuous <Continuous>  dextrose 50% Injectable 12.5 Gram(s) IV Push once  dextrose 50% Injectable 25 Gram(s) IV Push once  dextrose 50% Injectable 25 Gram(s) IV Push once  enoxaparin Injectable 40 milliGRAM(s) SubCutaneous every 24 hours  influenza   Vaccine 0.5 milliLiter(s) IntraMuscular once  insulin glargine Injectable (LANTUS) 20 Unit(s) SubCutaneous at bedtime  insulin lispro (HumaLOG) corrective regimen sliding scale   SubCutaneous three times a day before meals  insulin lispro (HumaLOG) corrective regimen sliding scale   SubCutaneous at bedtime  insulin lispro (HumaLOG) corrective regimen sliding scale   SubCutaneous every 6 hours  insulin lispro Injectable (HumaLOG) 5 Unit(s) SubCutaneous three times a day before meals  lactated ringers. 1000 milliLiter(s) (125 mL/Hr) IV Continuous <Continuous>  potassium chloride    Tablet ER 40 milliEquivalent(s) Oral every 4 hours  vancomycin  IVPB 1250 milliGRAM(s) IV Intermittent every 12 hours    MEDICATIONS  (PRN):  dextrose 40% Gel 15 Gram(s) Oral once PRN Blood Glucose LESS THAN 70 milliGRAM(s)/deciliter  glucagon  Injectable 1 milliGRAM(s) IntraMuscular once PRN Glucose LESS THAN 70 milligrams/deciliter      Vital Signs Last 24 Hrs  T(C): 36.7 (06 Dec 2019 04:12), Max: 36.8 (05 Dec 2019 22:26)  T(F): 98.1 (06 Dec 2019 04:12), Max: 98.2 (05 Dec 2019 22:26)  HR: 80 (06 Dec 2019 04:12) (80 - 93)  BP: 139/87 (06 Dec 2019 04:12) (124/83 - 139/87)  BP(mean): --  RR: 16 (06 Dec 2019 04:12) (16 - 16)  SpO2: 98% (06 Dec 2019 04:12) (98% - 99%)  CAPILLARY BLOOD GLUCOSE      POCT Blood Glucose.: 262 mg/dL (06 Dec 2019 07:29)  POCT Blood Glucose.: 262 mg/dL (06 Dec 2019 04:41)  POCT Blood Glucose.: 354 mg/dL (06 Dec 2019 01:50)  POCT Blood Glucose.: 387 mg/dL (06 Dec 2019 00:38)    I&O's Summary      PHYSICAL EXAM  GENERAL: NAD, lying comfortably in bed   HEENT:  Atraumatic, Normocephalic, EOMI, conjunctiva and sclera clear, no LAD  CHEST/LUNG: Clear to auscultation bilaterally; No wheeze  HEART: RRR, S1 and S2 No murmurs, rubs, or gallops  ABDOMEN: Soft, Nontender, Nondistended; Bowel sounds present  EXTREMITIES:  2+ Peripheral Pulses, No clubbing, cyanosis, or edema. Left index finger limited flexion and extension due to dressing. Dressing CDI. No erythema, fluctuance, or warmth  NEURO: AAOx3, non-focal  SKIN: No rashes or lesions    LABS:                        16.9   8.97  )-----------( 330      ( 05 Dec 2019 23:25 )             48.1     12-06    136  |  98  |  10  ----------------------------<  298<H>  3.2<L>   |  23  |  0.51    Ca    9.2      06 Dec 2019 06:54    TPro  6.8  /  Alb  3.8  /  TBili  0.6  /  DBili  x   /  AST  15  /  ALT  21  /  AlkPhos  125<H>  12-06    PT/INR - ( 06 Dec 2019 06:54 )   PT: 10.7 sec;   INR: 0.94 ratio         PTT - ( 06 Dec 2019 06:54 )  PTT:30.2 sec            RADIOLOGY & ADDITIONAL TESTS:  < from: Xray Hand 3 Views, Left (12.05.19 @ 22:56) >    ******PRELIMINARY REPORT******    ******PRELIMINARY REPORT******          EXAM:  HAND LEFT (MINIMUM 3 VIEWS)                            PROCEDURE DATE:  12/05/2019      ******PRELIMINARY REPORT******    ******PRELIMINARY REPORT******              INTERPRETATION:  no acute fractures or dislocations.  circumferential soft tissue swelling of the left second digit.              ******PRELIMINARY REPORT******    ******PRELIMINARY REPORT******          ANNELISE RODRIGUEZ M.D., RADIOLOGY RESIDENT    < end of copied text >    Imaging Personally Reviewed:  Consultant(s) Notes Reviewed:

## 2019-12-06 NOTE — H&P ADULT - HISTORY OF PRESENT ILLNESS
22 y/o M PMHx T1DM (on insulin and metformin), ADHD (on Adderall), presented to ED with L index finger swelling and pain. Patient reports he slammed his finger against a refrigerator door at work yesterday morning. Later on in the day he banged the same finger again, then noticed swelling and redness afterwards. He also notes he had a cut to the same finger a few days earlier. Denies any fever or chills. He is left hand dominant. Denies any drainage or pus. No trauma to any other fingers. No recent hospitalizations, no recent travel. Works 2 jobs, in [employer] and [employer].     In the ED:  Afebrile, normotensive, and satting well on RA. Labs significant for . Xray hand showed circumferential soft tissue swelling of the left second digit. Given vancomycin, 1L NS, and Humalog 6u. Ortho performed I&D and cx sent. 24 y/o M PMHx T1DM (on insulin and metformin), ADHD (on Adderall), presented to ED with L index finger swelling and pain. Patient reports he slammed his finger against a refrigerator door at work yesterday morning. Later on in the day he banged the same finger again, then noticed swelling and redness afterwards. He also notes he had a cut to the same finger a few days earlier. Denies any fever or chills. He is left hand dominant. Denies any drainage or pus. No trauma to any other fingers. No recent hospitalizations, no recent travel. Works 2 jobs, in [employer] and [employer].     In the ED:  Afebrile, normotensive, and satting well on RA. Labs significant for . Xray hand showed circumferential soft tissue swelling of the left second digit. Given vancomycin, 1L NS, and Humalog 6u. Ortho performed decompression and cx sent.

## 2019-12-06 NOTE — CONSULT NOTE ADULT - PROBLEM SELECTOR RECOMMENDATION 9
- last HbA1c ~14% per patient, will follow up HbA1c  - received Lantus 13 units this AM  - recommend increase Lantus to 18 units qAM starting tomorrow and adjust Humalog to 6 units before meals  - c/w low correction scale qac and qhs  - consistent carb diet  - check FS qac and qhs  - recommend RD consult   - will follow  - for discharge: final recommendations to be determined by HbA1c and insulin requirements while inpatient, may need basal bolus on discharge. To follow up with Dr. Muñoz as outpatient. Discussed with patient importance of improved glycemic control to reduce risk of infection, amputation, retinopathy, ESRD, CVA, MI etc

## 2019-12-06 NOTE — DISCHARGE NOTE PROVIDER - NSDCCPCAREPLAN_GEN_ALL_CORE_FT
PRINCIPAL DISCHARGE DIAGNOSIS  Diagnosis: Flexor tenosynovitis of finger  Assessment and Plan of Treatment: You came to the hospital for a swollen left index finger after injuring it. Our orthopedic doctor decompressed your finger and we collected cultures on it. Your wound culture grew _____. We gave you antibiotics and your hand swelling improved.      SECONDARY DISCHARGE DIAGNOSES  Diagnosis: Uncontrolled diabetes mellitus  Assessment and Plan of Treatment: You came to the hospital and were found to have high blood sugars >400. Your A1c was >15.5 and our endocrinologist came to see you. It is important to check your sugars and to use your insulin properly in order to have your wounds heal. Please follow up with your endocrinologist within 2 weeks. PRINCIPAL DISCHARGE DIAGNOSIS  Diagnosis: Flexor tenosynovitis of finger  Assessment and Plan of Treatment: You came to the hospital for a swollen left index finger after injuring it. Our orthopedics surgeon decompressed your finger and we collected wound culture. We gave you antibiotics and your hand swelling improved. Please continue to take Keflex, 1 tablet 4x per day for 10 days. Your course will finish on 12/17/19. Please follow up with Dr. Vazquez (Orthopedics surgery) in clinic on 12/11,  call office tomorrow to confirm appointment      SECONDARY DISCHARGE DIAGNOSES  Diagnosis: Uncontrolled diabetes mellitus  Assessment and Plan of Treatment: You came to the hospital and were found to have high blood sugars >400. Your A1c was >15.5 and our endocrinologist came to see you. It is important to check your sugars and to use your insulin properly in order to have your wounds heal. Please follow up with your endocrinologist  Dr. Muñoz within 1- 2 weeks. PRINCIPAL DISCHARGE DIAGNOSIS  Diagnosis: Flexor tenosynovitis of finger  Assessment and Plan of Treatment: You came to the hospital for a swollen left index finger after injuring it. Our orthopedics surgeon decompressed your finger and we collected wound culture. We gave you antibiotics and your hand swelling improved. Please continue to take Keflex, 1 tablet 4x per day for 10 days. Your course will finish on 12/17/19. Please follow up with Dr. Vazquez (Orthopedics surgery) in clinic on 12/11,  call office tomorrow to confirm appointment      SECONDARY DISCHARGE DIAGNOSES  Diagnosis: Uncontrolled diabetes mellitus  Assessment and Plan of Treatment: You came to the hospital and were found to have high blood sugars >400. Your A1c was >15.5 and our endocrinologist came to see you. It is important to check your sugars and to use your insulin properly in order to have your wounds heal. You will continue to take 20 units of Basaglar at bedtime.  You will start to take pre-meal insulin 6 units, 3 times  a day. We also prescribed a glucometer for you so you could monitor your blood sugar. Please follow up with your endocrinologist  Dr. Muñoz within 1- 2 weeks.

## 2019-12-06 NOTE — H&P ADULT - NSHPLABSRESULTS_GEN_ALL_CORE
12-05    129<L>  |  87<L>  |  12  ----------------------------<  476<HH>  4.0   |  26  |  0.52    Ca    10.0      05 Dec 2019 23:25    TPro  7.9  /  Alb  4.8  /  TBili  0.6  /  DBili  x   /  AST  22  /  ALT  26  /  AlkPhos  230<H>  12-05                            16.9   8.97  )-----------( 330      ( 05 Dec 2019 23:25 )             48.1     CAPILLARY BLOOD GLUCOSE      POCT Blood Glucose.: 354 mg/dL (06 Dec 2019 01:50)  POCT Blood Glucose.: 387 mg/dL (06 Dec 2019 00:38)    Blood Gas Source Venous: Venous (12-06 @ 00:43)      RADIOLOGY:  < from: Xray Hand 3 Views, Left (12.05.19 @ 22:56) >  EXAM:  HAND LEFT (MINIMUM 3 VIEWS)                        PROCEDURE DATE:  12/05/2019      ******PRELIMINARY REPORT******    ******PRELIMINARY REPORT******          INTERPRETATION:  no acute fractures or dislocations.  circumferential soft tissue swelling of the left second digit.  < end of copied text >

## 2019-12-06 NOTE — PROGRESS NOTE ADULT - ATTENDING COMMENTS
Pt seen and examined. 23M with poorly controlled type I DM, pw L index finger swelling and pain, concern for early infectious flexor tenosynovitis. Currently on vanco/cefepime. Care discussed with Dr. Squires (ortho) - no need for urgent OR at this time. Recommend BID betadine soaks and dry dressings. Appreciate endo recs on insulin regimen. Extensive education provided on importance of glycemic control especially in setting of infection for healing. Pt verbalized understanding    Manjula Brooks MD  Division of Hospital Medicine  Cell: [phone]  Pager: [phone]  Office: [phone] Pt seen and examined. 23M with poorly controlled type II DM, pw L index finger swelling and pain, concern for early infectious flexor tenosynovitis. Currently on vanco/cefepime. Care discussed with Dr. Squires (ortho) - no need for urgent OR at this time. Recommend BID betadine soaks and dry dressings. Appreciate endo recs on insulin regimen. Extensive education provided on importance of glycemic control especially in setting of infection for healing. Pt verbalized understanding    Manjula Brooks MD  Division of Hospital Medicine  Cell: [phone]  Pager: [phone]  Office: [phone]

## 2019-12-06 NOTE — H&P ADULT - PROBLEM SELECTOR PLAN 4
DVT Proph: IMPROVE score 0  Diet: CC  Dispo: d/c tomorrow likely DVT Proph: IMPROVE score 0  Diet: CC  Dispo: pending w/u

## 2019-12-06 NOTE — CONSULT NOTE ADULT - ASSESSMENT
22 yo male with Left Index Finger Paronychia, possible early Flexor Tenosynovitis    - Admit to medical team for monitoring/blood glucose control   - Pt tolerated decompression well, FU abscess cultures, Abx per primary team until cultures return  - Pt glucose>400 on admission, high risk for worsening of symptoms given uncontrolled diabetes, will monitor for worsening or developing flexor tenosynovitis  - NPO until eval today, IV fluids while NPO  - FInger sticks  - Possible OR today if symptoms worsen  - Will D/w Dr Vazquez and advise if plan changes

## 2019-12-06 NOTE — CHART NOTE - NSCHARTNOTEFT_GEN_A_CORE
Patient seen and examined  Per patient, appears to be improving  Afebrile    Circumferential swelling present  site of paronychia drainage with no new purulence  Patient not holding finger flexed, no pain with passive extension  No TTP over flexor tendon sheath  SILT throughout  WWP distally, good cap refill    A/P: s/p drainage of paronychia with cellulitis of finger    continue abx  BID betadine soaks  dry dressings  will continue to follow    Patient discussed with Dr. Vazquez who agrees with plan    TOMMY Squires MD

## 2019-12-07 LAB
ALBUMIN SERPL ELPH-MCNC: 3.8 G/DL — SIGNIFICANT CHANGE UP (ref 3.3–5)
ALP SERPL-CCNC: 105 U/L — SIGNIFICANT CHANGE UP (ref 40–120)
ALT FLD-CCNC: 21 U/L — SIGNIFICANT CHANGE UP (ref 10–45)
ANION GAP SERPL CALC-SCNC: 15 MMOL/L — SIGNIFICANT CHANGE UP (ref 5–17)
AST SERPL-CCNC: 14 U/L — SIGNIFICANT CHANGE UP (ref 10–40)
BILIRUB SERPL-MCNC: 0.6 MG/DL — SIGNIFICANT CHANGE UP (ref 0.2–1.2)
BUN SERPL-MCNC: 10 MG/DL — SIGNIFICANT CHANGE UP (ref 7–23)
CALCIUM SERPL-MCNC: 9.6 MG/DL — SIGNIFICANT CHANGE UP (ref 8.4–10.5)
CHLORIDE SERPL-SCNC: 100 MMOL/L — SIGNIFICANT CHANGE UP (ref 96–108)
CO2 SERPL-SCNC: 22 MMOL/L — SIGNIFICANT CHANGE UP (ref 22–31)
CREAT SERPL-MCNC: 0.5 MG/DL — SIGNIFICANT CHANGE UP (ref 0.5–1.3)
GLUCOSE BLDC GLUCOMTR-MCNC: 228 MG/DL — HIGH (ref 70–99)
GLUCOSE BLDC GLUCOMTR-MCNC: 231 MG/DL — HIGH (ref 70–99)
GLUCOSE BLDC GLUCOMTR-MCNC: 241 MG/DL — HIGH (ref 70–99)
GLUCOSE BLDC GLUCOMTR-MCNC: 305 MG/DL — HIGH (ref 70–99)
GLUCOSE SERPL-MCNC: 304 MG/DL — HIGH (ref 70–99)
HCT VFR BLD CALC: 48.7 % — SIGNIFICANT CHANGE UP (ref 39–50)
HGB BLD-MCNC: 16.5 G/DL — SIGNIFICANT CHANGE UP (ref 13–17)
MAGNESIUM SERPL-MCNC: 2.1 MG/DL — SIGNIFICANT CHANGE UP (ref 1.6–2.6)
MCHC RBC-ENTMCNC: 28.7 PG — SIGNIFICANT CHANGE UP (ref 27–34)
MCHC RBC-ENTMCNC: 33.9 GM/DL — SIGNIFICANT CHANGE UP (ref 32–36)
MCV RBC AUTO: 84.8 FL — SIGNIFICANT CHANGE UP (ref 80–100)
NRBC # BLD: 0 /100 WBCS — SIGNIFICANT CHANGE UP (ref 0–0)
PHOSPHATE SERPL-MCNC: 3.6 MG/DL — SIGNIFICANT CHANGE UP (ref 2.5–4.5)
PLATELET # BLD AUTO: 266 K/UL — SIGNIFICANT CHANGE UP (ref 150–400)
POTASSIUM SERPL-MCNC: 3.8 MMOL/L — SIGNIFICANT CHANGE UP (ref 3.5–5.3)
POTASSIUM SERPL-SCNC: 3.8 MMOL/L — SIGNIFICANT CHANGE UP (ref 3.5–5.3)
PROT SERPL-MCNC: 7 G/DL — SIGNIFICANT CHANGE UP (ref 6–8.3)
RBC # BLD: 5.74 M/UL — SIGNIFICANT CHANGE UP (ref 4.2–5.8)
RBC # FLD: 12.5 % — SIGNIFICANT CHANGE UP (ref 10.3–14.5)
SODIUM SERPL-SCNC: 137 MMOL/L — SIGNIFICANT CHANGE UP (ref 135–145)
WBC # BLD: 6.41 K/UL — SIGNIFICANT CHANGE UP (ref 3.8–10.5)
WBC # FLD AUTO: 6.41 K/UL — SIGNIFICANT CHANGE UP (ref 3.8–10.5)

## 2019-12-07 PROCEDURE — 99233 SBSQ HOSP IP/OBS HIGH 50: CPT | Mod: GC

## 2019-12-07 RX ORDER — ACETAMINOPHEN 500 MG
650 TABLET ORAL EVERY 6 HOURS
Refills: 0 | Status: DISCONTINUED | OUTPATIENT
Start: 2019-12-07 | End: 2019-12-08

## 2019-12-07 RX ADMIN — CEFEPIME 100 MILLIGRAM(S): 1 INJECTION, POWDER, FOR SOLUTION INTRAMUSCULAR; INTRAVENOUS at 06:43

## 2019-12-07 RX ADMIN — Medication 6 UNIT(S): at 17:24

## 2019-12-07 RX ADMIN — Medication 2: at 12:39

## 2019-12-07 RX ADMIN — Medication 2: at 17:24

## 2019-12-07 RX ADMIN — Medication 6 UNIT(S): at 12:38

## 2019-12-07 RX ADMIN — INSULIN GLARGINE 18 UNIT(S): 100 INJECTION, SOLUTION SUBCUTANEOUS at 10:14

## 2019-12-07 RX ADMIN — CEFEPIME 100 MILLIGRAM(S): 1 INJECTION, POWDER, FOR SOLUTION INTRAMUSCULAR; INTRAVENOUS at 17:23

## 2019-12-07 RX ADMIN — Medication 1 APPLICATION(S): at 06:44

## 2019-12-07 RX ADMIN — Medication 1 APPLICATION(S): at 17:25

## 2019-12-07 RX ADMIN — Medication 1 APPLICATION(S): at 06:43

## 2019-12-07 RX ADMIN — Medication 1 APPLICATION(S): at 17:24

## 2019-12-07 RX ADMIN — Medication 2: at 10:15

## 2019-12-07 RX ADMIN — Medication 2: at 21:16

## 2019-12-07 RX ADMIN — Medication 6 UNIT(S): at 10:15

## 2019-12-07 RX ADMIN — ENOXAPARIN SODIUM 40 MILLIGRAM(S): 100 INJECTION SUBCUTANEOUS at 06:44

## 2019-12-07 RX ADMIN — Medication 650 MILLIGRAM(S): at 17:30

## 2019-12-07 RX ADMIN — Medication 166.67 MILLIGRAM(S): at 12:38

## 2019-12-07 NOTE — PROGRESS NOTE ADULT - ASSESSMENT
22 y/o M PMHx T1DM (on insulin and metformin), ADHD (on Adderall), presented with L index finger swelling and pain, concern for early infectious flexor tenosynovitis s/p decompression on vancomycin and cefepime

## 2019-12-07 NOTE — PROGRESS NOTE ADULT - SUBJECTIVE AND OBJECTIVE BOX
Orthopedic Surgery Progress Note  S: Pain is well controlled.  Swelling improving. Afebrile    O:  Vital Signs Last 24 Hrs  T(C): 36.9 (06 Dec 2019 21:02), Max: 37.2 (06 Dec 2019 12:07)  T(F): 98.5 (06 Dec 2019 21:02), Max: 99 (06 Dec 2019 12:07)  HR: 103 (06 Dec 2019 21:02) (80 - 103)  BP: 123/79 (06 Dec 2019 21:02) (123/79 - 143/93)  RR: 18 (06 Dec 2019 21:02) (16 - 18)  SpO2: 99% (06 Dec 2019 21:02) (98% - 99%)    Gen: NAD  LUE  Circumferential swelling present, improving  site of paronychia drainage with some expressible purulence  Patient not holding finger flexed, no pain with passive extension  No TTP over flexor tendon sheath  SILT throughout  WWP distally, good cap refill                        16.9   8.97  )-----------( 330      ( 05 Dec 2019 23:25 )             48.1     12-06    136  |  98  |  10  ----------------------------<  298<H>  3.2<L>   |  23  |  0.51    PT/INR - ( 06 Dec 2019 06:54 )   PT: 10.7 sec;   INR: 0.94 ratio       PTT - ( 06 Dec 2019 06:54 )  PTT:30.2 sec    A/P 23y year old Male s/p drainage of paronychia L index finger    continue abx  4x per day betadine soaks  dry dressings  will continue to follow    Brendan Squires MD

## 2019-12-07 NOTE — PROGRESS NOTE ADULT - PROBLEM SELECTOR PLAN 1
Concern for early L index finger infectious flexor tenosynovitis s/p decompression with purulent drainage.   Vancomycin and cefepime to cover MRSA  and pseudomonas (poor DM1 control)  - c/w Vanc/Cefepime  - f/u wound culture  - Ortho recs  - Alk phos elevation, possibly from bone. If remains persistently elevated and not clinically improving, consider MRI to r/o osteomyelitis. Concern for early L index finger infectious flexor tenosynovitis s/p decompression with purulent drainage.   Vancomycin and cefepime to cover MRSA  and pseudomonas (poor DM1 control)  - c/w Vanc/Cefepime, transition to PO Keflex  - wound culture: Group B strep prelim  - Ortho recs Concern for early L index finger infectious flexor tenosynovitis s/p decompression with purulent drainage.   Vancomycin and cefepime to cover MRSA  and pseudomonas (poor DM1 control)  - c/w Cefepime, transition to PO Keflex on discharge  - wound culture: Group B strep prelim  - Ortho recs

## 2019-12-07 NOTE — PROGRESS NOTE ADULT - ATTENDING COMMENTS
Pt seen and examined. no fever overnight, MCP joint with improved swelling, pt able to flex his finger. Appreciate ortho recs. Given noncompliance and high risk of developing complication such as septic joint, will continue IV abx inpatient until improvement. Continue tight glycemic control    Manjula Brooks MD  Division of Hospital Medicine  Cell: [phone]  Pager: [phone]  Office: [phone]

## 2019-12-07 NOTE — PROGRESS NOTE ADULT - PROBLEM SELECTOR PLAN 2
on admission, s/p Humalog 6u and 13U lantus. Patient reports he is on Basaglar 20u at home, but splits it up into 3 doses.  -Lantus 18U with Humalog 6 TID pre meals  - diabetes education  - social work consult for affording insulin   - ISS q6h, finger sticks

## 2019-12-07 NOTE — PROGRESS NOTE ADULT - SUBJECTIVE AND OBJECTIVE BOX
Shilho nAand MD  PGY-1  Pager -[phone]  Pager G [phone]      Patient is a 23y old  Male who presents with a chief complaint of L index finger pain (07 Dec 2019 03:20)        SUBJECTIVE / OVERNIGHT EVENTS: Patient had no acute events overnight. Patient seen and examined at bedside this morning. L hand swelling and left index finger pain has improved. Patient tolerated diet    ROS: [ - ] Fever [ - ] Chills [ - ] Nausea/Vomiting [ - ] Chest Pain [ - ] Shortness of breath     MEDICATIONS  (STANDING):  cefepime   IVPB      cefepime   IVPB 2000 milliGRAM(s) IV Intermittent every 12 hours  clotrimazole 1% Cream 1 Application(s) Topical two times a day  dextrose 5%. 1000 milliLiter(s) (50 mL/Hr) IV Continuous <Continuous>  dextrose 50% Injectable 12.5 Gram(s) IV Push once  dextrose 50% Injectable 25 Gram(s) IV Push once  dextrose 50% Injectable 25 Gram(s) IV Push once  enoxaparin Injectable 40 milliGRAM(s) SubCutaneous every 24 hours  influenza   Vaccine 0.5 milliLiter(s) IntraMuscular once  insulin glargine Injectable (LANTUS) 18 Unit(s) SubCutaneous every morning  insulin lispro (HumaLOG) corrective regimen sliding scale   SubCutaneous three times a day before meals  insulin lispro (HumaLOG) corrective regimen sliding scale   SubCutaneous at bedtime  insulin lispro Injectable (HumaLOG) 6 Unit(s) SubCutaneous three times a day before meals  lactated ringers. 1000 milliLiter(s) (125 mL/Hr) IV Continuous <Continuous>  povidone iodine 10% Solution 1 Application(s) Topical two times a day  vancomycin  IVPB 1250 milliGRAM(s) IV Intermittent every 12 hours    MEDICATIONS  (PRN):  dextrose 40% Gel 15 Gram(s) Oral once PRN Blood Glucose LESS THAN 70 milliGRAM(s)/deciliter  glucagon  Injectable 1 milliGRAM(s) IntraMuscular once PRN Glucose LESS THAN 70 milligrams/deciliter      Vital Signs Last 24 Hrs  T(C): 36.3 (07 Dec 2019 04:12), Max: 37.2 (06 Dec 2019 12:07)  T(F): 97.3 (07 Dec 2019 04:12), Max: 99 (06 Dec 2019 12:07)  HR: 70 (07 Dec 2019 04:12) (70 - 103)  BP: 125/79 (07 Dec 2019 04:12) (123/79 - 143/93)  BP(mean): --  RR: 18 (07 Dec 2019 04:12) (16 - 18)  SpO2: 100% (07 Dec 2019 04:12) (99% - 100%)  CAPILLARY BLOOD GLUCOSE      POCT Blood Glucose.: 278 mg/dL (06 Dec 2019 22:31)  POCT Blood Glucose.: 196 mg/dL (06 Dec 2019 18:24)  POCT Blood Glucose.: 215 mg/dL (06 Dec 2019 11:40)    I&O's Summary    06 Dec 2019 07:01  -  07 Dec 2019 07:00  --------------------------------------------------------  IN: 480 mL / OUT: 0 mL / NET: 480 mL        PHYSICAL EXAM  GENERAL: NAD, lying comfortably in bed   HEENT:  Atraumatic, Normocephalic, EOMI, conjunctiva and sclera clear, no LAD  CHEST/LUNG: Clear to auscultation bilaterally; No wheeze  HEART: RRR, S1 and S2 No murmurs, rubs, or gallops  ABDOMEN: Soft, Nontender, Nondistended; Bowel sounds present  EXTREMITIES:  2+ Peripheral Pulses, No clubbing, cyanosis, or edema. Left index finger flexion and extension at metacarpal joint. Dressing CDI. No erythema, fluctuance, or warmth. Left hand swelling decreased  NEURO: AAOx3, non-focal  SKIN: No rashes or lesions    LABS:                        16.5   6.41  )-----------( 266      ( 07 Dec 2019 06:54 )             48.7     12-07    137  |  100  |  10  ----------------------------<  304<H>  3.8   |  22  |  0.50    Ca    9.6      07 Dec 2019 06:51  Phos  3.6     12-07  Mg     2.1     12-07    TPro  7.0  /  Alb  3.8  /  TBili  0.6  /  DBili  x   /  AST  14  /  ALT  21  /  AlkPhos  105  12-07    PT/INR - ( 06 Dec 2019 06:54 )   PT: 10.7 sec;   INR: 0.94 ratio         PTT - ( 06 Dec 2019 06:54 )  PTT:30.2 sec            RADIOLOGY & ADDITIONAL TESTS:  < from: Xray Hand 3 Views, Left (12.05.19 @ 22:56) >  EXAM:  HAND LEFT (MINIMUM 3 VIEWS)                            PROCEDURE DATE:  12/05/2019            INTERPRETATION:  CLINICAL INFORMATION: Left index finger swelling and   pain.    TECHNIQUE: 3 views of the left hand.    COMPARISON: None available.    IMPRESSION:     There is no acute fracture or dislocation of the left hand.The joint   spaces are maintained. There is circumferential soft tissue swelling of   the left second digit.            < end of copied text >    Imaging Personally Reviewed:  Consultant(s) Notes Reviewed:

## 2019-12-08 ENCOUNTER — TRANSCRIPTION ENCOUNTER (OUTPATIENT)
Age: 24
End: 2019-12-08

## 2019-12-08 VITALS
TEMPERATURE: 97 F | HEART RATE: 93 BPM | OXYGEN SATURATION: 100 % | RESPIRATION RATE: 18 BRPM | DIASTOLIC BLOOD PRESSURE: 69 MMHG | SYSTOLIC BLOOD PRESSURE: 122 MMHG

## 2019-12-08 LAB
ANION GAP SERPL CALC-SCNC: 16 MMOL/L — SIGNIFICANT CHANGE UP (ref 5–17)
BUN SERPL-MCNC: 13 MG/DL — SIGNIFICANT CHANGE UP (ref 7–23)
CALCIUM SERPL-MCNC: 9.3 MG/DL — SIGNIFICANT CHANGE UP (ref 8.4–10.5)
CHLORIDE SERPL-SCNC: 97 MMOL/L — SIGNIFICANT CHANGE UP (ref 96–108)
CO2 SERPL-SCNC: 24 MMOL/L — SIGNIFICANT CHANGE UP (ref 22–31)
CREAT SERPL-MCNC: 0.66 MG/DL — SIGNIFICANT CHANGE UP (ref 0.5–1.3)
GLUCOSE BLDC GLUCOMTR-MCNC: 184 MG/DL — HIGH (ref 70–99)
GLUCOSE BLDC GLUCOMTR-MCNC: 243 MG/DL — HIGH (ref 70–99)
GLUCOSE SERPL-MCNC: 290 MG/DL — HIGH (ref 70–99)
HCT VFR BLD CALC: 45.9 % — SIGNIFICANT CHANGE UP (ref 39–50)
HGB BLD-MCNC: 15.3 G/DL — SIGNIFICANT CHANGE UP (ref 13–17)
MAGNESIUM SERPL-MCNC: 2 MG/DL — SIGNIFICANT CHANGE UP (ref 1.6–2.6)
MCHC RBC-ENTMCNC: 28.2 PG — SIGNIFICANT CHANGE UP (ref 27–34)
MCHC RBC-ENTMCNC: 33.3 GM/DL — SIGNIFICANT CHANGE UP (ref 32–36)
MCV RBC AUTO: 84.5 FL — SIGNIFICANT CHANGE UP (ref 80–100)
NRBC # BLD: 0 /100 WBCS — SIGNIFICANT CHANGE UP (ref 0–0)
PHOSPHATE SERPL-MCNC: 4.1 MG/DL — SIGNIFICANT CHANGE UP (ref 2.5–4.5)
PLATELET # BLD AUTO: 267 K/UL — SIGNIFICANT CHANGE UP (ref 150–400)
POTASSIUM SERPL-MCNC: 3.3 MMOL/L — LOW (ref 3.5–5.3)
POTASSIUM SERPL-SCNC: 3.3 MMOL/L — LOW (ref 3.5–5.3)
RBC # BLD: 5.43 M/UL — SIGNIFICANT CHANGE UP (ref 4.2–5.8)
RBC # FLD: 12.2 % — SIGNIFICANT CHANGE UP (ref 10.3–14.5)
SODIUM SERPL-SCNC: 137 MMOL/L — SIGNIFICANT CHANGE UP (ref 135–145)
VANCOMYCIN TROUGH SERPL-MCNC: <4 UG/ML — LOW (ref 10–20)
WBC # BLD: 6.78 K/UL — SIGNIFICANT CHANGE UP (ref 3.8–10.5)
WBC # FLD AUTO: 6.78 K/UL — SIGNIFICANT CHANGE UP (ref 3.8–10.5)

## 2019-12-08 PROCEDURE — 99239 HOSP IP/OBS DSCHRG MGMT >30: CPT | Mod: GC

## 2019-12-08 PROCEDURE — 86900 BLOOD TYPING SEROLOGIC ABO: CPT

## 2019-12-08 PROCEDURE — 85027 COMPLETE CBC AUTOMATED: CPT

## 2019-12-08 PROCEDURE — 80048 BASIC METABOLIC PNL TOTAL CA: CPT

## 2019-12-08 PROCEDURE — 82010 KETONE BODYS QUAN: CPT

## 2019-12-08 PROCEDURE — 84132 ASSAY OF SERUM POTASSIUM: CPT

## 2019-12-08 PROCEDURE — 85730 THROMBOPLASTIN TIME PARTIAL: CPT

## 2019-12-08 PROCEDURE — 87070 CULTURE OTHR SPECIMN AEROBIC: CPT

## 2019-12-08 PROCEDURE — 82330 ASSAY OF CALCIUM: CPT

## 2019-12-08 PROCEDURE — 84295 ASSAY OF SERUM SODIUM: CPT

## 2019-12-08 PROCEDURE — 73130 X-RAY EXAM OF HAND: CPT

## 2019-12-08 PROCEDURE — 83605 ASSAY OF LACTIC ACID: CPT

## 2019-12-08 PROCEDURE — 84100 ASSAY OF PHOSPHORUS: CPT

## 2019-12-08 PROCEDURE — 85014 HEMATOCRIT: CPT

## 2019-12-08 PROCEDURE — 86850 RBC ANTIBODY SCREEN: CPT

## 2019-12-08 PROCEDURE — 83036 HEMOGLOBIN GLYCOSYLATED A1C: CPT

## 2019-12-08 PROCEDURE — 85610 PROTHROMBIN TIME: CPT

## 2019-12-08 PROCEDURE — 87205 SMEAR GRAM STAIN: CPT

## 2019-12-08 PROCEDURE — 86901 BLOOD TYPING SEROLOGIC RH(D): CPT

## 2019-12-08 PROCEDURE — 82435 ASSAY OF BLOOD CHLORIDE: CPT

## 2019-12-08 PROCEDURE — 80202 ASSAY OF VANCOMYCIN: CPT

## 2019-12-08 PROCEDURE — 93005 ELECTROCARDIOGRAM TRACING: CPT

## 2019-12-08 PROCEDURE — 83735 ASSAY OF MAGNESIUM: CPT

## 2019-12-08 PROCEDURE — 99285 EMERGENCY DEPT VISIT HI MDM: CPT

## 2019-12-08 PROCEDURE — 82803 BLOOD GASES ANY COMBINATION: CPT

## 2019-12-08 PROCEDURE — 82947 ASSAY GLUCOSE BLOOD QUANT: CPT

## 2019-12-08 PROCEDURE — 82962 GLUCOSE BLOOD TEST: CPT

## 2019-12-08 PROCEDURE — 80053 COMPREHEN METABOLIC PANEL: CPT

## 2019-12-08 RX ORDER — ISOPROPYL ALCOHOL, BENZOCAINE .7; .06 ML/ML; ML/ML
1 SWAB TOPICAL
Qty: 100 | Refills: 1
Start: 2019-12-08 | End: 2020-01-26

## 2019-12-08 RX ORDER — POTASSIUM CHLORIDE 20 MEQ
40 PACKET (EA) ORAL ONCE
Refills: 0 | Status: COMPLETED | OUTPATIENT
Start: 2019-12-08 | End: 2019-12-08

## 2019-12-08 RX ORDER — CEPHALEXIN 500 MG
1 CAPSULE ORAL
Qty: 40 | Refills: 0
Start: 2019-12-08 | End: 2019-12-17

## 2019-12-08 RX ORDER — INSULIN LISPRO 100/ML
6 VIAL (ML) SUBCUTANEOUS
Qty: 5 | Refills: 0
Start: 2019-12-08 | End: 2020-01-06

## 2019-12-08 RX ORDER — CEPHALEXIN 500 MG
500 CAPSULE ORAL
Refills: 0 | Status: DISCONTINUED | OUTPATIENT
Start: 2019-12-08 | End: 2019-12-08

## 2019-12-08 RX ORDER — METFORMIN HYDROCHLORIDE 850 MG/1
1000 TABLET ORAL
Qty: 0 | Refills: 0 | DISCHARGE

## 2019-12-08 RX ORDER — CEPHALEXIN 500 MG
500 CAPSULE ORAL EVERY 12 HOURS
Refills: 0 | Status: DISCONTINUED | OUTPATIENT
Start: 2019-12-08 | End: 2019-12-08

## 2019-12-08 RX ORDER — POVIDONE-IODINE 5 %
1 AEROSOL (ML) TOPICAL
Refills: 0 | Status: DISCONTINUED | OUTPATIENT
Start: 2019-12-08 | End: 2019-12-08

## 2019-12-08 RX ORDER — INSULIN LISPRO 100/ML
VIAL (ML) SUBCUTANEOUS EVERY 6 HOURS
Refills: 0 | Status: DISCONTINUED | OUTPATIENT
Start: 2019-12-08 | End: 2019-12-08

## 2019-12-08 RX ORDER — INSULIN GLARGINE 100 [IU]/ML
20 INJECTION, SOLUTION SUBCUTANEOUS
Qty: 0 | Refills: 0 | DISCHARGE

## 2019-12-08 RX ORDER — POVIDONE-IODINE 5 %
1 AEROSOL (ML) TOPICAL
Qty: 0 | Refills: 0 | DISCHARGE
Start: 2019-12-08

## 2019-12-08 RX ADMIN — Medication 1 APPLICATION(S): at 06:24

## 2019-12-08 RX ADMIN — Medication 166.67 MILLIGRAM(S): at 03:47

## 2019-12-08 RX ADMIN — Medication 2: at 07:22

## 2019-12-08 RX ADMIN — Medication 40 MILLIEQUIVALENT(S): at 11:10

## 2019-12-08 RX ADMIN — INSULIN GLARGINE 18 UNIT(S): 100 INJECTION, SOLUTION SUBCUTANEOUS at 07:59

## 2019-12-08 RX ADMIN — Medication 6 UNIT(S): at 13:21

## 2019-12-08 RX ADMIN — Medication 1: at 13:20

## 2019-12-08 RX ADMIN — Medication 500 MILLIGRAM(S): at 13:43

## 2019-12-08 RX ADMIN — SODIUM CHLORIDE 125 MILLILITER(S): 9 INJECTION, SOLUTION INTRAVENOUS at 02:31

## 2019-12-08 RX ADMIN — CEFEPIME 100 MILLIGRAM(S): 1 INJECTION, POWDER, FOR SOLUTION INTRAMUSCULAR; INTRAVENOUS at 06:24

## 2019-12-08 NOTE — PROGRESS NOTE ADULT - ASSESSMENT
24 y/o M PMHx T1DM (on insulin and metformin), ADHD (on Adderall), presented with L index finger swelling and pain, concern for early infectious flexor tenosynovitis s/p decompression. Wound Cx showed GBS sensitive ampicillin penicillin and Ancef,  will transition pt to PO Abx today

## 2019-12-08 NOTE — PROGRESS NOTE ADULT - SUBJECTIVE AND OBJECTIVE BOX
Orthopedic Progress Note     S:  No acute events overnight, pain is well controlled.  Patient denies any chest pain, SOB, N/V, fevers/chills.    T(C): 36.6 (12-08-19 @ 04:49), Max: 36.6 (12-08-19 @ 04:49)  HR: 61 (12-08-19 @ 04:49) (61 - 83)  BP: 113/73 (12-08-19 @ 04:49) (113/73 - 121/69)  RR: 18 (12-08-19 @ 04:49) (18 - 18)  SpO2: 97% (12-08-19 @ 04:49) (97% - 100%)  Wt(kg): --I&O's Summary    07 Dec 2019 07:01  -  08 Dec 2019 07:00  --------------------------------------------------------  IN: 600 mL / OUT: 0 mL / NET: 600 mL        O:  PE:  Gen: NAD  LUE  Mild swelling about distal phalyx/DIP  site of paronychia drainage with minimal expressible purulence  Patient not holding finger flexed, no pain with passive extension  No TTP over flexor tendon sheath  SILT throughout  WWP distally, good cap refill      Labs:                        15.3   6.78  )-----------( 267      ( 08 Dec 2019 06:55 )             45.9    12-08    137  |  97  |  13  ----------------------------<  290<H>  3.3<L>   |  24  |  0.66    Ca    9.3      08 Dec 2019 06:55  Phos  4.1     12-08  Mg     2.0     12-08    TPro  7.0  /  Alb  3.8  /  TBili  0.6  /  DBili  x   /  AST  14  /  ALT  21  /  AlkPhos  105  12-07 Orthopedic Progress Note     S:  No acute events overnight, pain is significantly improved from yesterday.  Patient denies any chest pain, SOB, N/V, fevers/chills.    T(C): 36.6 (12-08-19 @ 04:49), Max: 36.6 (12-08-19 @ 04:49)  HR: 61 (12-08-19 @ 04:49) (61 - 83)  BP: 113/73 (12-08-19 @ 04:49) (113/73 - 121/69)  RR: 18 (12-08-19 @ 04:49) (18 - 18)  SpO2: 97% (12-08-19 @ 04:49) (97% - 100%)  Wt(kg): --I&O's Summary    07 Dec 2019 07:01  -  08 Dec 2019 07:00  --------------------------------------------------------  IN: 600 mL / OUT: 0 mL / NET: 600 mL        O:  PE:  Gen: NAD  LUE  Mild swelling about distal phalynx/DIP, improved  site of paronychia drainage with minimal expressible purulence  Patient not holding finger flexed, no pain with passive extension  No TTP over flexor tendon sheath  SILT throughout  WWP distally, good cap refill      Labs:                        15.3   6.78  )-----------( 267      ( 08 Dec 2019 06:55 )             45.9    12-08    137  |  97  |  13  ----------------------------<  290<H>  3.3<L>   |  24  |  0.66    Ca    9.3      08 Dec 2019 06:55  Phos  4.1     12-08  Mg     2.0     12-08    TPro  7.0  /  Alb  3.8  /  TBili  0.6  /  DBili  x   /  AST  14  /  ALT  21  /  AlkPhos  105  12-07

## 2019-12-08 NOTE — PROGRESS NOTE ADULT - PROBLEM SELECTOR PLAN 3
States on Adderall at home  - no prescriptions on [facility] #[id number]
States on Adderall at home  - no prescriptions on [facility] #[id number]
States on Adderall at home  - no prescriptions on [facility] #[id number]

## 2019-12-08 NOTE — PROGRESS NOTE ADULT - ASSESSMENT
A/P 23y year old Male s/p drainage of paronychia L index finger, symptoms appear to be resolving.     continue abx  4x per day betadine soaks  dry dressings  will continue to follow A/P 23y year old Male s/p drainage of paronychia L index finger, symptoms appear to be resolving.     continue abx  4x per day betadine soaks  dry dressings  will continue to follow  Pt stable for DC today, pt is to see Dr. Vazquez in the Office on 12/11, call office for appointment  no further ortho intervention   ortho stable

## 2019-12-08 NOTE — PROGRESS NOTE ADULT - SUBJECTIVE AND OBJECTIVE BOX
Russ Saldivar, PGY2  Pager: [phone] ([facility]), [phone] ([facility])   After 7: Night Float pager    Patient is a 23y old  Male who presents with a chief complaint of L index finger pain (07 Dec 2019 09:52)      SUBJECTIVE / OVERNIGHT EVENTS:    MEDICATIONS  (STANDING):  cefepime   IVPB      cefepime   IVPB 2000 milliGRAM(s) IV Intermittent every 12 hours  clotrimazole 1% Cream 1 Application(s) Topical two times a day  dextrose 5%. 1000 milliLiter(s) (50 mL/Hr) IV Continuous <Continuous>  dextrose 50% Injectable 12.5 Gram(s) IV Push once  dextrose 50% Injectable 25 Gram(s) IV Push once  dextrose 50% Injectable 25 Gram(s) IV Push once  influenza   Vaccine 0.5 milliLiter(s) IntraMuscular once  insulin glargine Injectable (LANTUS) 18 Unit(s) SubCutaneous every morning  insulin lispro (HumaLOG) corrective regimen sliding scale   SubCutaneous every 6 hours  insulin lispro Injectable (HumaLOG) 6 Unit(s) SubCutaneous three times a day before meals  lactated ringers. 1000 milliLiter(s) (125 mL/Hr) IV Continuous <Continuous>  povidone iodine 10% Solution 1 Application(s) Topical two times a day  vancomycin  IVPB 1250 milliGRAM(s) IV Intermittent every 12 hours    MEDICATIONS  (PRN):  acetaminophen   Tablet .. 650 milliGRAM(s) Oral every 6 hours PRN Mild Pain (1 - 3)  dextrose 40% Gel 15 Gram(s) Oral once PRN Blood Glucose LESS THAN 70 milliGRAM(s)/deciliter  glucagon  Injectable 1 milliGRAM(s) IntraMuscular once PRN Glucose LESS THAN 70 milligrams/deciliter      Vital Signs Last 24 Hrs  T(C): 36.6 (08 Dec 2019 04:49), Max: 36.6 (08 Dec 2019 04:49)  T(F): 97.8 (08 Dec 2019 04:49), Max: 97.8 (08 Dec 2019 04:49)  HR: 61 (08 Dec 2019 04:49) (61 - 83)  BP: 113/73 (08 Dec 2019 04:49) (113/73 - 121/69)  BP(mean): --  RR: 18 (08 Dec 2019 04:49) (18 - 18)  SpO2: 97% (08 Dec 2019 04:49) (97% - 100%)  CAPILLARY BLOOD GLUCOSE      POCT Blood Glucose.: 243 mg/dL (08 Dec 2019 06:31)  POCT Blood Glucose.: 305 mg/dL (07 Dec 2019 21:06)  POCT Blood Glucose.: 241 mg/dL (07 Dec 2019 17:03)  POCT Blood Glucose.: 231 mg/dL (07 Dec 2019 12:32)  POCT Blood Glucose.: 228 mg/dL (07 Dec 2019 10:13)    I&O's Summary    07 Dec 2019 07:01  -  08 Dec 2019 07:00  --------------------------------------------------------  IN: 600 mL / OUT: 0 mL / NET: 600 mL        PHYSICAL EXAM:  GENERAL: NAD, well-developed  EYES: EOMI, PERRLA, conjunctiva and sclera clear  NECK:  No JVD  CHEST/LUNG: CTABL ; No wheeze  HEART: RRR; No murmurs  ABDOMEN: Soft, Nontender, Nondistended; Bowel sounds present  EXTREMITIES:  2+ Peripheral Pulses, No edema  MUSCULOSKEL:   PSYCH: AAOx   NEUROLOGY: CN 2-12 grossly intact, strength, sensory,   SKIN: No rashes or lesions. No sacral ulcer    LABS:                        16.5   6.41  )-----------( 266      ( 07 Dec 2019 06:54 )             48.7     12-07    137  |  100  |  10  ----------------------------<  304<H>  3.8   |  22  |  0.50    Ca    9.6      07 Dec 2019 06:51  Phos  3.6     12-07  Mg     2.1     12-07    TPro  7.0  /  Alb  3.8  /  TBili  0.6  /  DBili  x   /  AST  14  /  ALT  21  /  AlkPhos  105  12-07      RADIOLOGY & ADDITIONAL TESTS:  < from: Xray Hand 3 Views, Left (12.05.19 @ 22:56) >  IMPRESSION:     There is no acute fracture or dislocation of the left hand.The joint   spaces are maintained. There is circumferential soft tissue swelling of   the left second digit.        < end of copied text >          Consultant(s) Notes Reviewed:  Orthopedics Russ Saldivar, PGY2  Pager: [phone] ([facility]), [phone] ([facility])   After 7: Night Float pager    Patient is a 23y old  Male who presents with a chief complaint of L index finger pain (07 Dec 2019 09:52)      SUBJECTIVE / OVERNIGHT EVENTS: no acute event overnight, pt was seen and examined at bedside in AM,  denies fever, chills, SOB, finger pain.      MEDICATIONS  (STANDING):  cefepime   IVPB      cefepime   IVPB 2000 milliGRAM(s) IV Intermittent every 12 hours  clotrimazole 1% Cream 1 Application(s) Topical two times a day  dextrose 5%. 1000 milliLiter(s) (50 mL/Hr) IV Continuous <Continuous>  dextrose 50% Injectable 12.5 Gram(s) IV Push once  dextrose 50% Injectable 25 Gram(s) IV Push once  dextrose 50% Injectable 25 Gram(s) IV Push once  influenza   Vaccine 0.5 milliLiter(s) IntraMuscular once  insulin glargine Injectable (LANTUS) 18 Unit(s) SubCutaneous every morning  insulin lispro (HumaLOG) corrective regimen sliding scale   SubCutaneous every 6 hours  insulin lispro Injectable (HumaLOG) 6 Unit(s) SubCutaneous three times a day before meals  lactated ringers. 1000 milliLiter(s) (125 mL/Hr) IV Continuous <Continuous>  povidone iodine 10% Solution 1 Application(s) Topical two times a day  vancomycin  IVPB 1250 milliGRAM(s) IV Intermittent every 12 hours    MEDICATIONS  (PRN):  acetaminophen   Tablet .. 650 milliGRAM(s) Oral every 6 hours PRN Mild Pain (1 - 3)  dextrose 40% Gel 15 Gram(s) Oral once PRN Blood Glucose LESS THAN 70 milliGRAM(s)/deciliter  glucagon  Injectable 1 milliGRAM(s) IntraMuscular once PRN Glucose LESS THAN 70 milligrams/deciliter      Vital Signs Last 24 Hrs  T(C): 36.6 (08 Dec 2019 04:49), Max: 36.6 (08 Dec 2019 04:49)  T(F): 97.8 (08 Dec 2019 04:49), Max: 97.8 (08 Dec 2019 04:49)  HR: 61 (08 Dec 2019 04:49) (61 - 83)  BP: 113/73 (08 Dec 2019 04:49) (113/73 - 121/69)  BP(mean): --  RR: 18 (08 Dec 2019 04:49) (18 - 18)  SpO2: 97% (08 Dec 2019 04:49) (97% - 100%)  CAPILLARY BLOOD GLUCOSE      POCT Blood Glucose.: 243 mg/dL (08 Dec 2019 06:31)  POCT Blood Glucose.: 305 mg/dL (07 Dec 2019 21:06)  POCT Blood Glucose.: 241 mg/dL (07 Dec 2019 17:03)  POCT Blood Glucose.: 231 mg/dL (07 Dec 2019 12:32)  POCT Blood Glucose.: 228 mg/dL (07 Dec 2019 10:13)    I&O's Summary    07 Dec 2019 07:01  -  08 Dec 2019 07:00  --------------------------------------------------------  IN: 600 mL / OUT: 0 mL / NET: 600 mL        PHYSICAL EXAM:  GENERAL: NAD, well-developed  EYES: EOMI, PERRLA, conjunctiva and sclera clear  NECK:  No JVD  CHEST/LUNG: CTABL ; No wheeze  HEART: RRR; No murmurs  ABDOMEN: Soft, Nontender, Nondistended; Bowel sounds present  EXTREMITIES:  2+ Peripheral Pulses, No edema  MUSCULOSKEL:  + dressing over L. index finger, no pain with active and passive ROM, minimal drainage     PSYCH: AAOx 3    NEUROLOGY: no focal deficit    SKIN: No rashes or lesions. No sacral ulcer    LABS:                        16.5   6.41  )-----------( 266      ( 07 Dec 2019 06:54 )             48.7     12-07    137  |  100  |  10  ----------------------------<  304<H>  3.8   |  22  |  0.50    Ca    9.6      07 Dec 2019 06:51  Phos  3.6     12-07  Mg     2.1     12-07    TPro  7.0  /  Alb  3.8  /  TBili  0.6  /  DBili  x   /  AST  14  /  ALT  21  /  AlkPhos  105  12-07      RADIOLOGY & ADDITIONAL TESTS:  < from: Xray Hand 3 Views, Left (12.05.19 @ 22:56) >  IMPRESSION:     There is no acute fracture or dislocation of the left hand.The joint   spaces are maintained. There is circumferential soft tissue swelling of   the left second digit.    < end of copied text >      Consultant(s) Notes Reviewed:  Orthopedics

## 2019-12-08 NOTE — DISCHARGE NOTE NURSING/CASE MANAGEMENT/SOCIAL WORK - PATIENT PORTAL LINK FT
You can access the FollowMyHealth Patient Portal offered by [facility] by registering at the following website: [email or web address]. By joining [facility]’s FollowMyHealth portal, you will also be able to view your health information using other applications (apps) compatible with our system.

## 2019-12-08 NOTE — PROGRESS NOTE ADULT - PROBLEM SELECTOR PLAN 5
Transitions of Care Status:  1.  Name of PCP:  2.  PCP Contacted on Admission: [ ] Y    [ ] N    3.  PCP contacted at Discharge: [ ] Y    [ ] N    [ ] N/A  4.  Post-Discharge Appointment Date and Location:  5.  Summary of Handoff given to PCP:
Transitions of Care Status:  1.  Name of PCP:  2.  PCP Contacted on Admission: [ ] Y    [ ] N    3.  PCP contacted at Discharge: [ ] Y    [ ] N    [ ] N/A  4.  Post-Discharge Appointment Date and Location:  5.  Summary of Handoff given to PCP:
Transitions of Care Status:  1.  Name of PCP: none   2.  PCP Contacted on Admission: [ ] Y    [ ] N    3.  PCP contacted at Discharge: [ ] Y    [ ] N    [ ] N/A  4.  Post-Discharge Appointment Date and Location:  5.  Summary of Handoff given to PCP:

## 2019-12-08 NOTE — PROGRESS NOTE ADULT - PROBLEM SELECTOR PLAN 1
- Concern for early L index finger infectious flexor tenosynovitis s/p decompression with purulent drainage.   - Vancomycin (12/6 -12/8) and cefepime  (12/6 -12/8) to cover MRSA  and pseudomonas (poor DM1 control)  -  Wound Cx showed GBS sensitive ampicillin penicillin and Ancef   - PO Keflex on discharge  - Ortho recs appreciated

## 2019-12-08 NOTE — PROGRESS NOTE ADULT - ATTENDING COMMENTS
Pt seen and examined. 23M with uncontrolled DM II, pw flexor tenosynovitis s/p decompression and IV abx with improvement. Wound culture with GBS. Appreciate ortho recs - recommend betadine soaks 4x/day and follow up with [facility]rav[facility] in the Office on 12/11. Pt is to complete a course of abx as well. Extensive education on medication compliance including abx, insulin, provided to the patient, who verbalized the understanding. Complications including but not limited to worsening infection, joint infection explained if not compliant. Pt stable for discharge.    43 minutes spent on discharge process    Manjula Brooks MD  Division of Hospital Medicine  Cell: [phone]  Pager: [phone]  Office: [phone]

## 2019-12-11 LAB
CULTURE RESULTS: SIGNIFICANT CHANGE UP
CULTURE RESULTS: SIGNIFICANT CHANGE UP
SPECIMEN SOURCE: SIGNIFICANT CHANGE UP
SPECIMEN SOURCE: SIGNIFICANT CHANGE UP

## 2021-11-16 NOTE — DISCHARGE NOTE PROVIDER - NSDCDCMDCOMP_GEN_ALL_CORE
Pharmacist chart review completed for refill of otezla indicates no medication or significant health changes since last pharmacist counseling. No questions for the pharmacist. Communicated with patient over phone. Patient's prescription was billed through Medicare Part D. Medication shipped on 11/17 via [employer] to [address] [place] [address] for delivery in 1-2 business days.     [facility]  Phone: [phone]  [email or web address]           
This document is complete and the patient is ready for discharge.

## 2023-06-12 NOTE — PROGRESS NOTE ADULT - PROBLEM SELECTOR PROBLEM 2
Hyperglycemia due to type 1 diabetes mellitus
Oxybutynin Counseling:  I discussed with the patient the risks of oxybutynin including but not limited to skin rash, drowsiness, dry mouth, difficulty urinating, and blurred vision.
